# Patient Record
Sex: FEMALE | Race: WHITE | NOT HISPANIC OR LATINO | Employment: OTHER | URBAN - NONMETROPOLITAN AREA
[De-identification: names, ages, dates, MRNs, and addresses within clinical notes are randomized per-mention and may not be internally consistent; named-entity substitution may affect disease eponyms.]

---

## 2017-01-12 ENCOUNTER — INFUSION (OUTPATIENT)
Dept: ONCOLOGY | Facility: HOSPITAL | Age: 47
End: 2017-01-12

## 2017-01-12 VITALS
BODY MASS INDEX: 48.73 KG/M2 | SYSTOLIC BLOOD PRESSURE: 154 MMHG | HEIGHT: 63 IN | OXYGEN SATURATION: 97 % | DIASTOLIC BLOOD PRESSURE: 94 MMHG | RESPIRATION RATE: 20 BRPM | HEART RATE: 102 BPM | WEIGHT: 275 LBS | TEMPERATURE: 97.6 F

## 2017-01-12 DIAGNOSIS — C50.911 MALIGNANT NEOPLASM OF RIGHT FEMALE BREAST, UNSPECIFIED SITE OF BREAST: Primary | ICD-10-CM

## 2017-01-12 DIAGNOSIS — Z95.828 PORT CATHETER IN PLACE: ICD-10-CM

## 2017-01-12 PROCEDURE — 25010000002 HEPARIN FLUSH (PORCINE) 100 UNIT/ML SOLUTION: Performed by: INTERNAL MEDICINE

## 2017-01-12 PROCEDURE — G0463 HOSPITAL OUTPT CLINIC VISIT: HCPCS

## 2017-01-12 RX ORDER — SODIUM CHLORIDE 0.9 % (FLUSH) 0.9 %
10 SYRINGE (ML) INJECTION AS NEEDED
Status: DISCONTINUED | OUTPATIENT
Start: 2017-01-12 | End: 2017-01-12 | Stop reason: HOSPADM

## 2017-01-12 NOTE — PROGRESS NOTES
Unable to access port, rn's x2 attempted. Appears as though port has flipped. md called and will consult a surgeon. Pt and family member told and voice understanding.    No tx provided today.

## 2017-01-19 ENCOUNTER — ANESTHESIA EVENT (OUTPATIENT)
Dept: OPERATING ROOM | Age: 47
End: 2017-01-19

## 2017-01-20 ENCOUNTER — HOSPITAL ENCOUNTER (OUTPATIENT)
Dept: GENERAL RADIOLOGY | Age: 47
Discharge: HOME | End: 2017-01-20
Payer: MEDICARE

## 2017-01-20 ENCOUNTER — HOSPITAL ENCOUNTER (OUTPATIENT)
Dept: PREADMISSION TESTING | Age: 47
Setting detail: OUTPATIENT SURGERY
Discharge: HOME | End: 2017-01-20

## 2017-01-20 ENCOUNTER — HOSPITAL ENCOUNTER (OUTPATIENT)
Dept: LAB | Age: 47
Discharge: HOME | End: 2017-01-20
Payer: MEDICARE

## 2017-01-20 ENCOUNTER — HOSPITAL ENCOUNTER (OUTPATIENT)
Dept: NON INVASIVE DIAGNOSTICS | Age: 47
Discharge: HOME | End: 2017-01-20
Payer: MEDICARE

## 2017-01-20 DIAGNOSIS — Z01.818 PREOP EXAMINATION: ICD-10-CM

## 2017-01-20 LAB
ALBUMIN SERPL-MCNC: 4.5 G/DL (ref 3.5–5.2)
ALP BLD-CCNC: 104 U/L (ref 35–104)
ALT SERPL-CCNC: 22 U/L (ref 5–33)
ANION GAP SERPL CALCULATED.3IONS-SCNC: 18 MMOL/L (ref 7–19)
AST SERPL-CCNC: 7 U/L (ref 5–32)
BASOPHILS ABSOLUTE: 0.1 K/UL (ref 0–0.2)
BASOPHILS RELATIVE PERCENT: 0.5 % (ref 0–1)
BILIRUB SERPL-MCNC: 0.5 MG/DL (ref 0.2–1.2)
BUN BLDV-MCNC: 16 MG/DL (ref 6–20)
CALCIUM SERPL-MCNC: 9.8 MG/DL (ref 8.6–10)
CHLORIDE BLD-SCNC: 98 MMOL/L (ref 98–111)
CO2: 22 MMOL/L (ref 22–29)
CREAT SERPL-MCNC: 0.8 MG/DL (ref 0.5–0.9)
EOSINOPHILS ABSOLUTE: 0.1 K/UL (ref 0–0.6)
EOSINOPHILS RELATIVE PERCENT: 0.8 % (ref 0–5)
GFR NON-AFRICAN AMERICAN: >60
GLOBULIN: 3.8 G/DL
GLUCOSE BLD-MCNC: 124 MG/DL (ref 74–109)
HCT VFR BLD CALC: 49 % (ref 37–47)
HEMOGLOBIN: 16 G/DL (ref 12–16)
LYMPHOCYTES ABSOLUTE: 2.1 K/UL (ref 1.1–4.5)
LYMPHOCYTES RELATIVE PERCENT: 17.8 % (ref 20–40)
MCH RBC QN AUTO: 30.2 PG (ref 27–31)
MCHC RBC AUTO-ENTMCNC: 32.7 G/DL (ref 33–37)
MCV RBC AUTO: 92.6 FL (ref 81–99)
MONOCYTES ABSOLUTE: 0.7 K/UL (ref 0–0.9)
MONOCYTES RELATIVE PERCENT: 6.4 % (ref 0–10)
NEUTROPHILS ABSOLUTE: 8.6 K/UL (ref 1.5–7.5)
NEUTROPHILS RELATIVE PERCENT: 74.5 % (ref 50–65)
PDW BLD-RTO: 14.3 % (ref 11.5–14.5)
PLATELET # BLD: 419 K/UL (ref 130–400)
PMV BLD AUTO: 11 FL (ref 7.4–10.4)
POTASSIUM SERPL-SCNC: 4.3 MMOL/L (ref 3.5–5)
RBC # BLD: 5.29 M/UL (ref 4.2–5.4)
SODIUM BLD-SCNC: 138 MMOL/L (ref 136–145)
TOTAL PROTEIN: 8.3 G/DL (ref 6.6–8.7)
WBC # BLD: 11.6 K/UL (ref 4.8–10.8)

## 2017-01-20 PROCEDURE — 71020 XR CHEST STANDARD TWO VW: CPT

## 2017-01-20 PROCEDURE — 93005 ELECTROCARDIOGRAM TRACING: CPT

## 2017-01-24 ENCOUNTER — HOSPITAL ENCOUNTER (OUTPATIENT)
Age: 47
Setting detail: OUTPATIENT SURGERY
Discharge: HOME | End: 2017-01-24
Attending: SPECIALIST | Admitting: SPECIALIST

## 2017-01-24 ENCOUNTER — ANESTHESIA (OUTPATIENT)
Dept: OPERATING ROOM | Age: 47
End: 2017-01-24
Payer: MEDICARE

## 2017-01-24 VITALS
BODY MASS INDEX: 44.39 KG/M2 | HEIGHT: 64 IN | RESPIRATION RATE: 18 BRPM | SYSTOLIC BLOOD PRESSURE: 146 MMHG | HEART RATE: 98 BPM | TEMPERATURE: 99.4 F | DIASTOLIC BLOOD PRESSURE: 97 MMHG | OXYGEN SATURATION: 95 % | WEIGHT: 260 LBS

## 2017-01-24 VITALS — OXYGEN SATURATION: 99 % | DIASTOLIC BLOOD PRESSURE: 71 MMHG | SYSTOLIC BLOOD PRESSURE: 126 MMHG

## 2017-01-24 PROCEDURE — 00400 ANES INTEGUMENTARY SYS NOS: CPT | Performed by: NURSE ANESTHETIST, CERTIFIED REGISTERED

## 2017-01-24 PROCEDURE — G8907 PT DOC NO EVENTS ON DISCHARG: HCPCS

## 2017-01-24 PROCEDURE — 36590 REMOVAL TUNNELED CV CATH: CPT

## 2017-01-24 PROCEDURE — G8916 PT W IV AB GIVEN ON TIME: HCPCS

## 2017-01-24 RX ORDER — HYDROMORPHONE HCL 110MG/55ML
0.25 PATIENT CONTROLLED ANALGESIA SYRINGE INTRAVENOUS EVERY 5 MIN PRN
Status: DISCONTINUED | OUTPATIENT
Start: 2017-01-24 | End: 2017-01-24 | Stop reason: HOSPADM

## 2017-01-24 RX ORDER — ONDANSETRON 2 MG/ML
4 INJECTION INTRAMUSCULAR; INTRAVENOUS
Status: DISCONTINUED | OUTPATIENT
Start: 2017-01-24 | End: 2017-01-24 | Stop reason: HOSPADM

## 2017-01-24 RX ORDER — PROPOFOL 10 MG/ML
INJECTION, EMULSION INTRAVENOUS PRN
Status: DISCONTINUED | OUTPATIENT
Start: 2017-01-24 | End: 2017-01-24 | Stop reason: SDUPTHER

## 2017-01-24 RX ORDER — PROMETHAZINE HYDROCHLORIDE 25 MG/ML
12.5 INJECTION, SOLUTION INTRAMUSCULAR; INTRAVENOUS
Status: DISCONTINUED | OUTPATIENT
Start: 2017-01-24 | End: 2017-01-24 | Stop reason: HOSPADM

## 2017-01-24 RX ORDER — HYDRALAZINE HYDROCHLORIDE 20 MG/ML
5 INJECTION INTRAMUSCULAR; INTRAVENOUS EVERY 10 MIN PRN
Status: DISCONTINUED | OUTPATIENT
Start: 2017-01-24 | End: 2017-01-24 | Stop reason: HOSPADM

## 2017-01-24 RX ORDER — SODIUM CHLORIDE, SODIUM LACTATE, POTASSIUM CHLORIDE, CALCIUM CHLORIDE 600; 310; 30; 20 MG/100ML; MG/100ML; MG/100ML; MG/100ML
INJECTION, SOLUTION INTRAVENOUS CONTINUOUS
Status: DISCONTINUED | OUTPATIENT
Start: 2017-01-24 | End: 2017-01-24 | Stop reason: HOSPADM

## 2017-01-24 RX ORDER — OXYCODONE HYDROCHLORIDE AND ACETAMINOPHEN 5; 325 MG/1; MG/1
1 TABLET ORAL PRN
Status: DISCONTINUED | OUTPATIENT
Start: 2017-01-24 | End: 2017-01-24 | Stop reason: HOSPADM

## 2017-01-24 RX ORDER — FENTANYL CITRATE 50 UG/ML
INJECTION, SOLUTION INTRAMUSCULAR; INTRAVENOUS PRN
Status: DISCONTINUED | OUTPATIENT
Start: 2017-01-24 | End: 2017-01-24 | Stop reason: SDUPTHER

## 2017-01-24 RX ORDER — HYDROMORPHONE HCL 110MG/55ML
0.5 PATIENT CONTROLLED ANALGESIA SYRINGE INTRAVENOUS EVERY 5 MIN PRN
Status: DISCONTINUED | OUTPATIENT
Start: 2017-01-24 | End: 2017-01-24 | Stop reason: HOSPADM

## 2017-01-24 RX ORDER — LABETALOL HYDROCHLORIDE 5 MG/ML
5 INJECTION, SOLUTION INTRAVENOUS EVERY 10 MIN PRN
Status: DISCONTINUED | OUTPATIENT
Start: 2017-01-24 | End: 2017-01-24 | Stop reason: HOSPADM

## 2017-01-24 RX ORDER — DIPHENHYDRAMINE HYDROCHLORIDE 50 MG/ML
12.5 INJECTION INTRAMUSCULAR; INTRAVENOUS
Status: DISCONTINUED | OUTPATIENT
Start: 2017-01-24 | End: 2017-01-24 | Stop reason: HOSPADM

## 2017-01-24 RX ORDER — AMLODIPINE BESYLATE 5 MG/1
5 TABLET ORAL DAILY
COMMUNITY

## 2017-01-24 RX ORDER — MEPERIDINE HYDROCHLORIDE 25 MG/ML
12.5 INJECTION INTRAMUSCULAR; INTRAVENOUS; SUBCUTANEOUS EVERY 5 MIN PRN
Status: DISCONTINUED | OUTPATIENT
Start: 2017-01-24 | End: 2017-01-24 | Stop reason: HOSPADM

## 2017-01-24 RX ORDER — LIDOCAINE HYDROCHLORIDE 10 MG/ML
INJECTION, SOLUTION INFILTRATION; PERINEURAL PRN
Status: DISCONTINUED | OUTPATIENT
Start: 2017-01-24 | End: 2017-01-24 | Stop reason: HOSPADM

## 2017-01-24 RX ORDER — METOPROLOL TARTRATE 50 MG/1
50 TABLET, FILM COATED ORAL DAILY
COMMUNITY

## 2017-01-24 RX ORDER — OXYCODONE HYDROCHLORIDE AND ACETAMINOPHEN 5; 325 MG/1; MG/1
2 TABLET ORAL PRN
Status: DISCONTINUED | OUTPATIENT
Start: 2017-01-24 | End: 2017-01-24 | Stop reason: HOSPADM

## 2017-01-24 RX ORDER — FENTANYL CITRATE 50 UG/ML
50 INJECTION, SOLUTION INTRAMUSCULAR; INTRAVENOUS EVERY 5 MIN PRN
Status: DISCONTINUED | OUTPATIENT
Start: 2017-01-24 | End: 2017-01-24 | Stop reason: HOSPADM

## 2017-01-24 RX ORDER — MIDAZOLAM HYDROCHLORIDE 1 MG/ML
INJECTION INTRAMUSCULAR; INTRAVENOUS PRN
Status: DISCONTINUED | OUTPATIENT
Start: 2017-01-24 | End: 2017-01-24 | Stop reason: SDUPTHER

## 2017-01-24 RX ORDER — LEVOTHYROXINE SODIUM 0.1 MG/1
100 TABLET ORAL DAILY
COMMUNITY

## 2017-01-24 RX ORDER — HYDROCODONE BITARTRATE AND ACETAMINOPHEN 7.5; 325 MG/1; MG/1
1 TABLET ORAL EVERY 4 HOURS PRN
Qty: 30 TABLET | Refills: 0
Start: 2017-01-24 | End: 2017-01-31

## 2017-01-24 RX ADMIN — MIDAZOLAM HYDROCHLORIDE 1 MG: 1 INJECTION INTRAMUSCULAR; INTRAVENOUS at 13:52

## 2017-01-24 RX ADMIN — FENTANYL CITRATE 50 MCG: 50 INJECTION, SOLUTION INTRAMUSCULAR; INTRAVENOUS at 13:52

## 2017-01-24 RX ADMIN — PROPOFOL 200 MG: 10 INJECTION, EMULSION INTRAVENOUS at 13:53

## 2017-01-24 RX ADMIN — SODIUM CHLORIDE, SODIUM LACTATE, POTASSIUM CHLORIDE, CALCIUM CHLORIDE: 600; 310; 30; 20 INJECTION, SOLUTION INTRAVENOUS at 12:58

## 2019-03-12 ENCOUNTER — LAB REQUISITION (OUTPATIENT)
Dept: LAB | Facility: HOSPITAL | Age: 49
End: 2019-03-12

## 2019-03-12 DIAGNOSIS — Z17.1 ESTROGEN RECEPTOR NEGATIVE TUMOR STATUS: ICD-10-CM

## 2019-03-12 DIAGNOSIS — C50.411 MALIGNANT NEOPLASM OF UPPER-OUTER QUADRANT OF RIGHT FEMALE BREAST (HCC): ICD-10-CM

## 2019-03-12 LAB
ALBUMIN SERPL-MCNC: 4.8 G/DL (ref 3.5–5)
ALBUMIN/GLOB SERPL: 1.3 G/DL (ref 1.1–2.5)
ALP SERPL-CCNC: 115 U/L (ref 24–120)
ALT SERPL W P-5'-P-CCNC: 22 U/L (ref 0–54)
ANION GAP SERPL CALCULATED.3IONS-SCNC: 15 MMOL/L (ref 4–13)
AST SERPL-CCNC: 9 U/L (ref 7–45)
BILIRUB SERPL-MCNC: 0.6 MG/DL (ref 0.1–1)
BUN BLD-MCNC: 18 MG/DL (ref 5–21)
BUN/CREAT SERPL: 26.5 (ref 7–25)
CALCIUM SPEC-SCNC: 10.6 MG/DL (ref 8.4–10.4)
CEA SERPL-MCNC: 3.52 NG/ML (ref 0–5)
CHLORIDE SERPL-SCNC: 99 MMOL/L (ref 98–110)
CO2 SERPL-SCNC: 26 MMOL/L (ref 24–31)
CREAT BLD-MCNC: 0.68 MG/DL (ref 0.5–1.4)
GFR SERPL CREATININE-BSD FRML MDRD: 92 ML/MIN/1.73
GLOBULIN UR ELPH-MCNC: 3.8 GM/DL
GLUCOSE BLD-MCNC: 133 MG/DL (ref 70–100)
POTASSIUM BLD-SCNC: 4.7 MMOL/L (ref 3.5–5.3)
PROT SERPL-MCNC: 8.6 G/DL (ref 6.3–8.7)
SODIUM BLD-SCNC: 140 MMOL/L (ref 135–145)

## 2019-03-12 PROCEDURE — 86300 IMMUNOASSAY TUMOR CA 15-3: CPT | Performed by: INTERNAL MEDICINE

## 2019-03-12 PROCEDURE — 80053 COMPREHEN METABOLIC PANEL: CPT | Performed by: INTERNAL MEDICINE

## 2019-03-12 PROCEDURE — 82378 CARCINOEMBRYONIC ANTIGEN: CPT | Performed by: INTERNAL MEDICINE

## 2019-03-14 LAB — CANCER AG27-29 SERPL-ACNC: 26.1 U/ML (ref 0–38.6)

## 2019-12-05 DIAGNOSIS — C50.919 MALIGNANT NEOPLASM OF FEMALE BREAST, UNSPECIFIED ESTROGEN RECEPTOR STATUS, UNSPECIFIED LATERALITY, UNSPECIFIED SITE OF BREAST (HCC): Primary | ICD-10-CM

## 2019-12-06 DIAGNOSIS — C50.919 MALIGNANT NEOPLASM OF FEMALE BREAST, UNSPECIFIED ESTROGEN RECEPTOR STATUS, UNSPECIFIED LATERALITY, UNSPECIFIED SITE OF BREAST (HCC): Primary | ICD-10-CM

## 2019-12-10 ENCOUNTER — LAB (OUTPATIENT)
Dept: ONCOLOGY | Facility: CLINIC | Age: 49
End: 2019-12-10

## 2019-12-10 DIAGNOSIS — C50.919 MALIGNANT NEOPLASM OF FEMALE BREAST, UNSPECIFIED ESTROGEN RECEPTOR STATUS, UNSPECIFIED LATERALITY, UNSPECIFIED SITE OF BREAST (HCC): ICD-10-CM

## 2019-12-10 LAB
ALBUMIN SERPL-MCNC: 4.5 G/DL (ref 3.5–5.2)
ALBUMIN/GLOB SERPL: 1.3 G/DL
ALP SERPL-CCNC: 111 U/L (ref 39–117)
ALT SERPL W P-5'-P-CCNC: 14 U/L (ref 1–33)
ANION GAP SERPL CALCULATED.3IONS-SCNC: 15 MMOL/L (ref 5–15)
AST SERPL-CCNC: 7 U/L (ref 1–32)
BASOPHILS # BLD AUTO: 0.07 10*3/MM3 (ref 0–0.2)
BASOPHILS NFR BLD AUTO: 0.7 % (ref 0–1.5)
BILIRUB SERPL-MCNC: 0.4 MG/DL (ref 0.2–1.2)
BUN BLD-MCNC: 15 MG/DL (ref 6–20)
BUN/CREAT SERPL: 20.3 (ref 7–25)
CALCIUM SPEC-SCNC: 10 MG/DL (ref 8.6–10.5)
CHLORIDE SERPL-SCNC: 98 MMOL/L (ref 98–107)
CO2 SERPL-SCNC: 27 MMOL/L (ref 22–29)
CREAT BLD-MCNC: 0.74 MG/DL (ref 0.57–1)
DEPRECATED RDW RBC AUTO: 45.4 FL (ref 37–54)
EOSINOPHIL # BLD AUTO: 0.07 10*3/MM3 (ref 0–0.4)
EOSINOPHIL NFR BLD AUTO: 0.7 % (ref 0.3–6.2)
ERYTHROCYTE [DISTWIDTH] IN BLOOD BY AUTOMATED COUNT: 12.8 % (ref 12.3–15.4)
GFR SERPL CREATININE-BSD FRML MDRD: 83 ML/MIN/1.73
GLOBULIN UR ELPH-MCNC: 3.4 GM/DL
GLUCOSE BLD-MCNC: 154 MG/DL (ref 65–99)
HCT VFR BLD AUTO: 49.2 % (ref 34–46.6)
HGB BLD-MCNC: 15.6 G/DL (ref 12–15.9)
IMM GRANULOCYTES # BLD AUTO: 0.04 10*3/MM3 (ref 0–0.05)
IMM GRANULOCYTES NFR BLD AUTO: 0.4 % (ref 0–0.5)
LYMPHOCYTES # BLD AUTO: 1.83 10*3/MM3 (ref 0.7–3.1)
LYMPHOCYTES NFR BLD AUTO: 17 % (ref 19.6–45.3)
MCH RBC QN AUTO: 30.1 PG (ref 26.6–33)
MCHC RBC AUTO-ENTMCNC: 31.7 G/DL (ref 31.5–35.7)
MCV RBC AUTO: 95 FL (ref 79–97)
MONOCYTES # BLD AUTO: 0.68 10*3/MM3 (ref 0.1–0.9)
MONOCYTES NFR BLD AUTO: 6.3 % (ref 5–12)
NEUTROPHILS # BLD AUTO: 8.07 10*3/MM3 (ref 1.7–7)
NEUTROPHILS NFR BLD AUTO: 74.9 % (ref 42.7–76)
PLATELET # BLD AUTO: 262 10*3/MM3 (ref 140–450)
PMV BLD AUTO: 10.4 FL (ref 6–12)
POTASSIUM BLD-SCNC: 3.9 MMOL/L (ref 3.5–5.2)
PROT SERPL-MCNC: 7.9 G/DL (ref 6–8.5)
RBC # BLD AUTO: 5.18 10*6/MM3 (ref 3.77–5.28)
SODIUM BLD-SCNC: 140 MMOL/L (ref 136–145)
WBC NRBC COR # BLD: 10.76 10*3/MM3 (ref 3.4–10.8)

## 2019-12-10 PROCEDURE — 82378 CARCINOEMBRYONIC ANTIGEN: CPT | Performed by: INTERNAL MEDICINE

## 2019-12-10 PROCEDURE — 86300 IMMUNOASSAY TUMOR CA 15-3: CPT | Performed by: INTERNAL MEDICINE

## 2019-12-10 PROCEDURE — 80053 COMPREHEN METABOLIC PANEL: CPT | Performed by: INTERNAL MEDICINE

## 2019-12-10 PROCEDURE — 85025 COMPLETE CBC W/AUTO DIFF WBC: CPT | Performed by: INTERNAL MEDICINE

## 2019-12-10 PROCEDURE — 36415 COLL VENOUS BLD VENIPUNCTURE: CPT | Performed by: INTERNAL MEDICINE

## 2019-12-11 LAB — CEA SERPL-MCNC: 3.8 NG/ML

## 2019-12-12 LAB — CANCER AG27-29 SERPL-ACNC: 29.9 U/ML (ref 0–38.6)

## 2019-12-14 NOTE — PROGRESS NOTES
[facility] ONC [facility] ONCOLOGY  [address] [place] [address] [address]  [facility] [address]  [phone]    Patient Name: Gopal Powers  Encounter Date: 12/17/2019  YOB: 1970  Patient Number: [id number]      REASON FOR VISIT:  Ms. Gopal Powers is a 49-year-old female who returns in follow-up of locally advanced breast cancer. She is seen 78 months since completing cycle 6 of neoadjuvant ACT chemotherapy. She underwent bilateral mastectomies on 07/30/2013 and is now 73.5 months since cycle 4 of adjuvant cisplatin. She completed adjuvant radiation 69 months ago on 07/14/2014. She is here with her sister, Shayla (previously with her brother-in-law, Padilla). History is obtained from the patient who is considered to be a reliable historian.     DIAGNOSTIC ABNORMALITIES:   1. Bilateral mammogram and right breast ultrasound, 12/18/2012, [facility]: Nodule just superior and lateral to the right nipple measuring 15 x 16 mm. Another density in the upper outer quadrant measures 12 x 17 mm. Deep to this is a large nodular group of nodules that measure 3.4 x 3.6 cm. Another 3 cm nodule, probable enlarged lymph node, in the right axillary region. Ultrasound showed multiple lesions at the 11 o'clock position. One measures 2.9 x 2.4 x 2 cm. Another 1.7 x 1.4 x 1.8 cm. Another 1.9 x 1.6 x 1.9 cm (the latter predominantly solid). The 4th lesion at the 11 o'clock position measures 1.2 x 0.8 x 1.1 cm. Three nodules in the right axillary region; one measures 2.9 x 2.7 x 2.1 cm, another 1.8 x 1.3 x 1.7 cm, and a third measures 1.2 x 0.7 x 1 cm. The lesion at the 10 o'clock position in the right breast measures 1.4 x 1.2 x 1.6 cm. This appears lobulated, predominantly solid.  2. Chest x-ray, 01/04/2013: Negative chest radiograph.  3. Ultrasound-guided biopsy of the right breast masses, 01/07/2013: Large lobulated mass in the upper outer quadrant of the right breast and the retroareolar  area of the right breast. Noted was an enlarged pathologic appearing right axillary node which was not biopsied. Pathology from the specimens taken during the procedure included the biopsy from the 10 o'clock position (5.7 cm lesion) revealing infiltrating mammary carcinoma. Tumor present in virtually every biopsy. Tumor high grade (20 mitoses per high-powered field). Tumor measured 1 cm in greatest linear dimension. The specimen from the 11 o'clock position in the retroareolar area measured 1.7 cm. Infiltrating mammary carcinoma. Tumor present in virtually every biopsy. Tumor high grade. Tumor measured 1.1 cm in greatest linear dimension.  4. CT scan chest/abdomen/pelvis 01/21/2013, [facility]: Right axillary lymphadenopathy (the more anterior of 2 nodules measures 3.5 x 2.7 cm; the more posterior measures 2.6 x 2.1 cm). Skin thickening in the partially visualized right breast. Left ovary enlarged with a dermoid tumor within it. Previous cholecystectomy.  5. Labs, 02/01/2013: Hemoglobin 15.8, hematocrit 49, MCV 93.1, platelets 350,000, WBC 11.7, ANC 8.6. CMP revealed glucose 164, potassium 3.1, SGOT 6, GFR 97.1. CEA 3.2, CA27-29 of 22.9.  6. 2D echo, 02/05/2013, [facility]: Technically limited and suboptimal study. Left ventricular chamber size, wall thickness, and wall motion normal. No segmental wall motion abnormalities noted. Left ventricular (LV) systolic function normal. Ejection fraction measured at 63%. Left ventricular (LV) diastolic parameters normal. Both atria are normal size. No atrial clots or masses seen. Mitral valve leaflets normal thickness and excursion. No mitral regurgitation. Aortic valve leaflets normal thickness and excursion. Minimal aortic regurgitation. No aortic stenosis. Tricuspid valve anatomically normal. No tricuspid regurgitation. No pericardial effusion.  7. PET scan, 10/15/2013, [facility]: Abnormal uptake in the chest wall bilaterally that  appears to correspond to areas of scarring in both breasts (likely related to the recent surgery), greater on the right side with SUV max 6.9. Multiple areas of paravertebral uptake in the thorax, likely muscular or related to brown fat uptake. Mild uptake in 3 small normal sized lymph nodes in the left axilla. SUV max of the most intense uptake 2. 4.4 cm dermoid tumor in the pelvis in the midline anterior to the uterine fundus, likely arising from the left ovary. Right ovary seen separately. No other areas of pathologic uptake are seen within the neck, chest, abdomen, or pelvis.  8. BRCA1 and 2, 12/02/2012, [facility]: No mutations detected.  9. Surgical pathology, left tube and ovary, 05/29/2014, [facility]: Fallopian tube showed no significant pathologic changes. Ovary showed benign mature teratoma.  10. Peripheral blood comprehensive report, 11/16/2016: Mild absolute leukocytosis. COMMENT. Peripheral blood evaluation: Leukocytosis comprised predominantly of granulocytes with a normal complement of lymphocytes, monocytes, and platelets. Flow cytometric studies: No evidence of significant immunophenotypic aberrancies. No specific findings in the peripheral blood to suggest a cause for the patient's relative neutrophilia. Diagnosis of a myeloid stem cell disorder, if clinically suspected, is best made using bone marrow biopsy. Peripheral blood may not always be fully representative of an underlying bone marrow disorder. Clinical correlation recommended.    PREVIOUS INTERVENTIONS:   1. Neoadjuvant ACT chemotherapy, 02/18/2013 through 06/18/2013. 6 cycles. Prior cycles of chemo delayed due to shingles outbreak.  2. Right modified radical mastectomy (MRM), left prophylactic mastectomy, 07/30/2013. Dr. Mejia. Pathology revealed: 1) Breast and lymph node, right modified radical mastectomy: Poorly differentiated infiltrating ductal cell carcinoma with extensive necrosis. (Margins of resection free of involvement).  Fibrocystic changes. Fibroadenoma. Metastatic carcinoma in lymph nodes (2 out of 27 lymph nodes). 2) Breast, total left mastectomy: Nonproliferative fibrocystic changes (negative for malignancy). ER: Absent/Negative 0.81%. MO: Absent/Negative 0.09 %. HER-2: Not over-expressed. 1+  3. Adjuvant cisplatin chemotherapy, 11/06/2013 through 01/16/2014. 4 cycles.  4. Adjuvant radiation beginning 05/13/2014 through 07/14/2014 (5040 cGy with 1000 cGy boost).        Problem List Items Addressed This Visit        Other    Malignant neoplasm of right female breast (CMS/HCC) - Primary           Malignant neoplasm of right female breast (CMS/HCC)    9/2/2016 Initial Diagnosis     Malignant neoplasm of right female breast (CMS/HCC)      12/13/2019 Cancer Staged     Staging form: Breast, AJCC V7  - Clinical stage from 12/13/2019: Stage IIIA (T3(m), N2a, M0) - Signed by Ludwin Whitaker MD on 12/13/2019         PAST MEDICAL HISTORY:  ALLERGIES:  No Known Allergies  CURRENT MEDICATIONS:  Outpatient Encounter Medications as of 12/17/2019   Medication Sig Dispense Refill   • amLODIPine (NORVASC) 5 MG tablet Take 5 mg by mouth Daily.  3   • atorvastatin (LIPITOR) 40 MG tablet Take 40 mg by mouth Daily.  3   • levothyroxine (SYNTHROID, LEVOTHROID) 50 MCG tablet Take 50 mcg by mouth Every Morning Before Breakfast.  3   • metFORMIN (GLUCOPHAGE) 500 MG tablet Take 500 mg by mouth.       No facility-administered encounter medications on file as of 12/17/2019.      ADULT ILLNESSES:   Breast cancer ( ER Status: Negative; MO Status: Negative; ICD-10:C50.411 ;Malignant neoplasm of upper-outer quadrant of right female breast )   Abnormal chest x ray ( ICD-10:R93.8 ;Abnormal findings on diagnostic imaging of other specified body structures   Chronic kidney disease ( ICD-10:N18.3 ;Chronic kidney disease, stage 3 (moderate)   Disorder of breast (disorder) ( lesion; ICD-10:D24.1 ;Benign neoplasm of right breast )   History of mastectomy (  ICD-10:Z90.11 ;Acquired absence of right breast and nipple   Hyperlipidemia ( ICD-10:E78.5 ;Hyperlipidemia, unspecified   Hypertension ( ICD-10:I10 ;Essential (primary) hypertension   Hypothyroidism ( ICD-10:E03.9 ;Hypothyroidism, unspecified   Leukocytosis ( ICD-10:D72.829 ;Elevated white blood cell count, unspecified   Mental retardation ( ICD-10:F79 ;Unspecified intellectual disabilities   Obesity ( ICD-10:E66.9 ;Obesity, unspecified   Wound dehiscence ( right mastectomy; ICD-10:T81.31xS ;Disruption of external operation (surgical) wound, not elsewhere classified, sequela )    SURGERIES:   Removal of left port and placement of right subclavian port, 03/05/2013. Dr. Mejia   Left clavicle repair of childhood fracture   Excision of breast lump: Right breast mass excision, 11/10/2011, Dr. Saini   Port removal (right subclavian) sometime 01/23/2017. Dr. Baldwin   Laparoscopic cholecystectomy, 2001, Dr. Flynn   Oophorectomy, left, 06/29/2014, Dr. Dodson   Oophorectomy, right, 05/29/2014. A 4.4 cm dermoid tumor. Pathology showed benign teratoma   Left subclavian Mediport, 02/08/2013, Dr. Mejia      ADULT ILLNESSES:  Patient Active Problem List   Diagnosis Code   • Malignant neoplasm of right female breast (CMS/HCC) C50.911   • Port catheter in place Z95.828     SURGERIES:  No past surgical history on file.  HEALTH MAINTENANCE ITEMS:  Health Maintenance Due   Topic Date Due   • MAMMOGRAM  1970   • TDAP/TD VACCINES (1 - Tdap) 07/04/1980   • ZOSTER VACCINE (1 of 2) 07/04/2019   • INFLUENZA VACCINE  08/01/2019   • MEDICARE ANNUAL WELLNESS  12/05/2019   • PAP SMEAR  12/05/2019   • COLONOSCOPY  12/05/2019       <no information>  Last Completed Colonoscopy       Status Date      COLONOSCOPY No completions recorded          There is no immunization history on file for this patient.  Last Completed Mammogram       Status Date      MAMMOGRAM No completions recorded            FAMILY HISTORY:  History reviewed. No  "pertinent family history.  SOCIAL HISTORY:  Social History     Socioeconomic History   • Marital status: Single     Spouse name: Not on file   • Number of children: Not on file   • Years of education: Not on file   • Highest education level: Not on file   Tobacco Use   • Smoking status: Never Smoker   • Smokeless tobacco: Never Used       REVIEW OF SYSTEMS:  Constitutional:   The patient's appetite is good, sister says, \"a bit too good.\" Her energy is fair. Sister says she remains sedentary. She has regained 3 pounds (lost 3 pounds at her prior visit - had intentionally lost 27 pounds at her visit prior to that) since her last visit. Sister says she still supervises her meals, \"healthier choices.\" She has no fevers, chills, or drenching night sweats. Her sleep habits seem appropriate.  Ear/Nose/Mouth/Throat:   She reports no ear pains, but has allergy sinus symptoms, without sore throat, nosebleeds, or sore tongue. She has no headaches. She denies any hoarseness, change in voice quality, or hemoptysis.  Ocular:   She reports no eye pain, significant change in visual acuity, double vision, or blurry vision.  Respiratory:   She has baseline exertional dyspnea but has no shortness of breath with her routine activities (sedentary). She reports no cough, no significant shortness of breathing at rest, phlegm production, or unexplained chest wall pain.  Cardiovascular:   She reports no exertional chest pain, chest pressure, or chest heaviness. She has no claudication. She reports no palpitations or symptomatic orthostasis.  Gastrointestinal:   She reports no dysphagia, nausea, vomiting, postprandial abdominal pain, bloating, cramping, change in bowel habits, or discoloration of the stool. She reports no rectal bleeding. She has not had any constipation but still has bouts of soft stools (chronic) but no overt diarrhea.  Genitourinary:   She reports no urinary burning, frequency, dribbling, or discoloration. She reports no " "need to urinate frequently through the night. She reports no difficulty controlling her bladder.  GYN:   She has no unexplained vaginal bleeding and no significant vaginal discharge.  Breasts:   She denies any more \"soreness\" from the surgical sites.  Musculoskeletal:   She reports no unexplained arthralgias, myalgias, or nighttime leg cramping.  Extremities:   She reports trouble with fluid retention in the arms but no significant leg swelling.  Endocrine:   She reports no problems with excess thirst, excessive urination, vasomotor instability, or unexplained fatigue.  Heme/Lymphatic:   She reports no unexplained bleeding, bruising, petechial rash, or swollen glands.  Skin:   She has no recurrent itching or rash around the lower abdominal incision site. No recurrent shingles on her back.  Neuro:   She reports no loss of consciousness, seizures, fainting spells, or dizziness. She has no weakness of her face, arms, or legs. She has no difficulty with speech. She has no tremors.  Psych:   She seems generally satisfied with life. She denies depression. She reports no mood swings.      VITAL SIGNS: /94   Pulse 102   Temp 97.4 °F (36.3 °C)   Resp 16   Ht 160 cm (63\")   Wt 122 kg (269 lb)   SpO2 97%   Breastfeeding No   BMI 47.65 kg/m² Body surface area is 2.19 meters squared.  Pain Score    12/17/19 1040   PainSc: 0-No pain         PHYSICAL EXAMINATION:   General:   She is a pleasant, obese but visibly slimmer and modestly-kept female who is comfortable at rest. She arrived in the exam room ambulatory. She appears to be her stated age. Her skin color is normal. ECOG PS = 0  Head/Neck:   The patient is anicteric and atraumatic. The oropharynx, mouth, and throat are clear. Dentition is poor. The trachea is midline. The neck is supple without evidence of jugular venous distention or cervical adenopathy.  Eyes:   The pupils are equal, round, and reactive to light. The extraocular movements are full. There is no " scleral jaundice or erythema.  Chest:   The respiratory efforts are normal and unhindered. The chest is clear to auscultation and percussion. There are no wheezes, rhonchi, rales, or asymmetry of breath sounds.  Breasts:   Inspection of the mastectomy sites is notable for healed wounds with underlying scar thickness and overlying skin folds (left over post-op). There is no axillary adenopathy. The previous Mediport site in the left upper chest is healed. The right-sided Mediport has been removed (sometime 01/23/2017 per Dr. Baldwin) after it flipped over. The site is healed with moderate scar formation.  Cardiovascular:   The patient has a regular cardiac rate and rhythm without murmurs, rubs, or gallops. The peripheral pulses are equal and full.  Abdomen:   The belly is soft and obese. Her habitus precludes an adequate exam. There is no rebound or guarding. There is no obvious organomegaly, mass-effect, or tenderness. Bowel sounds are active and of normal character. The lower abdominal incision is healed.   Extremities:   There is evidence of arm lymphedema, right greater than left with no evidence of cyanosis or clubbing with 1+ ankle edema.  Rheumatologic:   There is no overt evidence of rheumatoid deformities of the hands. There is no sausaging of the fingers. There is no sign of active synovitis. The gait is normal.  Cutaneous:   There are no overt rashes, disseminated lesions, purpura, or petechiae.   Lymphatics:   There is no evidence of adenopathy in the cervical, supraclavicular, or axillary areas.  Neurologic:   The patient is alert, oriented, cooperative, and pleasant. She is appropriately conversant. She ambulated into the exam room without assistance and transferred from chair to exam table unassisted today. There is no overt dysfunction of the motor, sensory, or cerebellar systems.  Psych:   Mood and affect are appropriate for circumstance. Eye contact is appropriate.        LABS    Lab Results - Last  18 Months   Lab Units 12/10/19  1329   HEMOGLOBIN g/dL 15.6   HEMATOCRIT % 49.2*   MCV fL 95.0   WBC 10*3/mm3 10.76   RDW % 12.8   MPV fL 10.4   PLATELETS 10*3/mm3 262   IMM GRAN % % 0.4   NEUTROS ABS 10*3/mm3 8.07*   LYMPHS ABS 10*3/mm3 1.83   MONOS ABS 10*3/mm3 0.68   EOS ABS 10*3/mm3 0.07   BASOS ABS 10*3/mm3 0.07   IMMATURE GRANS (ABS) 10*3/mm3 0.04       Lab Results - Last 18 Months   Lab Units 12/10/19  1329 03/12/19  1600   GLUCOSE mg/dL 154* 133*   SODIUM mmol/L 140 140   POTASSIUM mmol/L 3.9 4.7   CO2 mmol/L 27.0 26.0   CHLORIDE mmol/L 98 99   ANION GAP mmol/L 15.0 15.0*   CREATININE mg/dL 0.74 0.68   BUN mg/dL 15 18   BUN / CREAT RATIO  20.3 26.5*   CALCIUM mg/dL 10.0 10.6*   EGFR IF NONAFRICN AM mL/min/1.73 83 92   ALK PHOS U/L 111 115   TOTAL PROTEIN g/dL 7.9 8.6   ALT (SGPT) U/L 14 22   AST (SGOT) U/L 7 9   BILIRUBIN mg/dL 0.4 0.6   ALBUMIN g/dL 4.50 4.80   GLOBULIN gm/dL 3.4 3.8       Lab Results - Last 18 Months   Lab Units 12/10/19  1329 03/12/19  1600   CEA ng/mL 3.80 3.52       ASSESSMENT:   1. Right breast invasive ductal carcinoma:   Stage: At least IIIA (cT3, cN2a, M0, G3), ER (-)/WA (-). HER-2/[place] (-). High grade (20 mitoses per high-powered field).   Tumor [place]: Multiple solid nodules in the right breast (multiple lesions at 11 o'clock measuring 2.9 x 2.4 x 2.2 cm, 1.7 x 1.4 x 1.8 cm, 1.9 x 1.6 x 1.9 cm, 1.2 x 0.8 x 1.1 cm); a lesion at the 10 o'clock in the right breast measuring 1.4 x 1.2 x 1.6 cm; 3 nodules in the right axillary region measuring 2.9 x 2.7 x 2.1 cm, another 1.8 x 1.3 x 1.7 cm, and a third measuring 1.2 x 0.7 x 1 cm.   Complications of Tumor: Pain and fullness of the right breast and right axilla.   BRCA status (1 and 2): No mutations, 12/2013.   Tumor Status: Underwent neoadjuvant chemotherapy followed by bilateral mastectomies, 07/30/2013. Pathology revealed: 1. Breast and lymph node, right modified radical mastectomy: Poorly differentiated infiltrating ductal cell  "carcinoma with extensive necrosis. (Margins of resection free of involvement). Fibrocystic changes. Fibroadenoma. Metastatic carcinoma in lymph nodes (2 out of 27 lymph nodes). 2. Breast, total left mastectomy: Nonproliferative fibrocystic changes (negative for malignancy). Adjuvant cisplatin and radiation completed. LENARD.  2. Hypercalcemia, mild. Has stopped Diovan/HCTZ. Resolved  3. Mental retardation.   4. Mild leukocytosis, NOS. Flow cytometry, 11/16/2016 (above) unrevealing. Normal counts since 10/24/2018.   5. Borderline erythrocytosis.   6. Lymphedema both arms. Asymptomatic.   7. Hypothyroidism.   8. Mildly abnormal CEA. Stable, 3.8 on 12/10/2019 (prior range: 2.2 - 5.2).   9. Obesity.   10. Chronic kidney disease, GFR 57.35 mL/min, 01/27/2014, and 02/12/2014. Stable, GFR 83 mL/min, 12/10/2019 (prior range: 51 - 92 mL/min).   11. Abnormal chest x-ray, 02/06/2015. Atelectasis versus small area of pneumonia.   a. Repeat chest x-ray 04/06/2015 was essentially unchanged, PCP says \"everything okay.\"   b. Chest x-ray, 10/16/2015 and 03/11/2016 at [facility]: No active disease.   c. [facility], Chest x-ray, 06/09/2016. Impression: Chronic elevation of the right diaphragm. No acute cardiac or pulmonary abnormality is identified.   d. Chest x-ray performed at [facility] on 4/28/2017. Impression: Chronic elevation of the right diaphragm. No acute cardiac or pulmonary abnormality is identified.    PLAN:  1.  Re: Apprised of labs from 12/10/2019 with stable CBC, including no leukocytosis, normal MCV without anemia, normal platelets, mild hyperglycemia, normocalcemia with otherwise normal CMP, elevated (stable) CEA, and normal CA27-29. Consider restaging scans if CEA continues to rise.   2. Remind them of the plans as outlined by Dr. Puente on 09/30/2013 (date she saw the patient). Dr. Puente suggested a PET/CT (above) and suggested considering BRCA testing (no mutations, 12/2013). " Dr. Puente recommended: Cisplatin 25 mg/m2 every 3 weeks x4 cycles, otherwise observation with treatment at the time of relapse. Dr. Puente agrees that she is at high risk for relapse.   3.  Observe.   4.  Continue ongoing management per primary care physician and other specialists.   5. Advance Directive discussed.   6. Return to the [place] office in 24 weeks with pre-office CMP, CEA, CA27-29, and CBC with differential.    MEDICAL DECISION MAKING: Low Complexity   AMOUNT OF DATA: Moderate     TIME SPENT: Face-to-face time on this encounter, as defined by the American Medical Association in the 2019 Current Procedural Terminology codebook; assessment, record review, lab review, planning and education - 22 minutes (> 50% face to face).     cc: MD Evi Mast MD Patricia Williams, MD Peter Locken, MD

## 2019-12-17 ENCOUNTER — OFFICE VISIT (OUTPATIENT)
Dept: ONCOLOGY | Facility: CLINIC | Age: 49
End: 2019-12-17

## 2019-12-17 VITALS
OXYGEN SATURATION: 97 % | SYSTOLIC BLOOD PRESSURE: 138 MMHG | TEMPERATURE: 97.4 F | BODY MASS INDEX: 47.66 KG/M2 | WEIGHT: 269 LBS | RESPIRATION RATE: 16 BRPM | HEIGHT: 63 IN | DIASTOLIC BLOOD PRESSURE: 94 MMHG | HEART RATE: 102 BPM

## 2019-12-17 DIAGNOSIS — Z17.1 MALIGNANT NEOPLASM OF RIGHT BREAST IN FEMALE, ESTROGEN RECEPTOR NEGATIVE, UNSPECIFIED SITE OF BREAST (HCC): Primary | ICD-10-CM

## 2019-12-17 DIAGNOSIS — C50.911 MALIGNANT NEOPLASM OF RIGHT BREAST IN FEMALE, ESTROGEN RECEPTOR NEGATIVE, UNSPECIFIED SITE OF BREAST (HCC): Primary | ICD-10-CM

## 2019-12-17 PROCEDURE — 99213 OFFICE O/P EST LOW 20 MIN: CPT | Performed by: INTERNAL MEDICINE

## 2019-12-17 RX ORDER — LEVOTHYROXINE SODIUM 0.05 MG/1
50 TABLET ORAL
Refills: 3 | COMMUNITY
Start: 2019-11-26

## 2019-12-17 RX ORDER — ATORVASTATIN CALCIUM 40 MG/1
40 TABLET, FILM COATED ORAL DAILY
Refills: 3 | COMMUNITY
Start: 2019-11-26

## 2019-12-17 RX ORDER — AMLODIPINE BESYLATE 5 MG/1
5 TABLET ORAL DAILY
Refills: 3 | COMMUNITY
Start: 2019-11-26

## 2020-06-25 ENCOUNTER — LAB (OUTPATIENT)
Dept: LAB | Facility: HOSPITAL | Age: 50
End: 2020-06-25

## 2020-06-25 DIAGNOSIS — C50.919 MALIGNANT NEOPLASM OF FEMALE BREAST, UNSPECIFIED ESTROGEN RECEPTOR STATUS, UNSPECIFIED LATERALITY, UNSPECIFIED SITE OF BREAST (HCC): ICD-10-CM

## 2020-06-25 LAB
ALBUMIN SERPL-MCNC: 4.4 G/DL (ref 3.5–5.2)
ALBUMIN/GLOB SERPL: 1.4 G/DL
ALP SERPL-CCNC: 102 U/L (ref 39–117)
ALT SERPL W P-5'-P-CCNC: 13 U/L (ref 1–33)
ANION GAP SERPL CALCULATED.3IONS-SCNC: 13 MMOL/L (ref 5–15)
AST SERPL-CCNC: 6 U/L (ref 1–32)
BASOPHILS # BLD AUTO: 0.08 10*3/MM3 (ref 0–0.2)
BASOPHILS NFR BLD AUTO: 0.6 % (ref 0–1.5)
BILIRUB SERPL-MCNC: 0.3 MG/DL (ref 0.2–1.2)
BUN BLD-MCNC: 22 MG/DL (ref 6–20)
BUN/CREAT SERPL: 28.9 (ref 7–25)
CALCIUM SPEC-SCNC: 10.1 MG/DL (ref 8.6–10.5)
CEA SERPL-MCNC: 3.66 NG/ML
CHLORIDE SERPL-SCNC: 101 MMOL/L (ref 98–107)
CO2 SERPL-SCNC: 27 MMOL/L (ref 22–29)
CREAT BLD-MCNC: 0.76 MG/DL (ref 0.57–1)
DEPRECATED RDW RBC AUTO: 43.7 FL (ref 37–54)
EOSINOPHIL # BLD AUTO: 0.12 10*3/MM3 (ref 0–0.4)
EOSINOPHIL NFR BLD AUTO: 0.9 % (ref 0.3–6.2)
ERYTHROCYTE [DISTWIDTH] IN BLOOD BY AUTOMATED COUNT: 13 % (ref 12.3–15.4)
GFR SERPL CREATININE-BSD FRML MDRD: 81 ML/MIN/1.73
GLOBULIN UR ELPH-MCNC: 3.1 GM/DL
GLUCOSE BLD-MCNC: 142 MG/DL (ref 65–99)
HCT VFR BLD AUTO: 46.4 % (ref 34–46.6)
HGB BLD-MCNC: 14.8 G/DL (ref 12–15.9)
IMM GRANULOCYTES # BLD AUTO: 0.04 10*3/MM3 (ref 0–0.05)
IMM GRANULOCYTES NFR BLD AUTO: 0.3 % (ref 0–0.5)
LYMPHOCYTES # BLD AUTO: 2.12 10*3/MM3 (ref 0.7–3.1)
LYMPHOCYTES NFR BLD AUTO: 16.3 % (ref 19.6–45.3)
MCH RBC QN AUTO: 29.4 PG (ref 26.6–33)
MCHC RBC AUTO-ENTMCNC: 31.9 G/DL (ref 31.5–35.7)
MCV RBC AUTO: 92.2 FL (ref 79–97)
MONOCYTES # BLD AUTO: 0.86 10*3/MM3 (ref 0.1–0.9)
MONOCYTES NFR BLD AUTO: 6.6 % (ref 5–12)
NEUTROPHILS # BLD AUTO: 9.79 10*3/MM3 (ref 1.7–7)
NEUTROPHILS NFR BLD AUTO: 75.3 % (ref 42.7–76)
NRBC BLD AUTO-RTO: 0 /100 WBC (ref 0–0.2)
PLATELET # BLD AUTO: 361 10*3/MM3 (ref 140–450)
PMV BLD AUTO: 10.4 FL (ref 6–12)
POTASSIUM BLD-SCNC: 4.5 MMOL/L (ref 3.5–5.2)
PROT SERPL-MCNC: 7.5 G/DL (ref 6–8.5)
RBC # BLD AUTO: 5.03 10*6/MM3 (ref 3.77–5.28)
SODIUM BLD-SCNC: 141 MMOL/L (ref 136–145)
WBC NRBC COR # BLD: 13.01 10*3/MM3 (ref 3.4–10.8)

## 2020-06-25 PROCEDURE — 82378 CARCINOEMBRYONIC ANTIGEN: CPT

## 2020-06-25 PROCEDURE — 86300 IMMUNOASSAY TUMOR CA 15-3: CPT

## 2020-06-25 PROCEDURE — 80053 COMPREHEN METABOLIC PANEL: CPT

## 2020-06-25 PROCEDURE — 85025 COMPLETE CBC W/AUTO DIFF WBC: CPT

## 2020-06-25 PROCEDURE — 36415 COLL VENOUS BLD VENIPUNCTURE: CPT

## 2020-06-26 LAB — CANCER AG27-29 SERPL-ACNC: 22.1 U/ML (ref 0–38.6)

## 2020-07-02 NOTE — PROGRESS NOTES
MGW ONC [facility] ONCOLOGY  [address] [place] [address] [address]  [facility] [address]  [phone]    Patient Name: Gopal Powers  Encounter Date: 07/07/2020  YOB: 1970  Patient Number: [id number]    REASON FOR VISIT:  Ms. Gopal Powers is a 49-year-old female who returns in follow-up of locally advanced breast cancer. She is seen 85 months since completing cycle 6 of neoadjuvant ACT chemotherapy. She underwent bilateral mastectomies on 07/30/2013 and is now 80.5 months since cycle 4 of adjuvant cisplatin. She completed adjuvant radiation nearly 72 months ago on 07/14/2014. She is here with her sister, Shayla (previously with her brother-in-law, Padilla). History is obtained from the patient who is considered to be a reliable historian.     DIAGNOSTIC ABNORMALITIES:   1. Bilateral mammogram and right breast ultrasound, 12/18/2012, [facility]: Nodule just superior and lateral to the right nipple measuring 15 x 16 mm. Another density in the upper outer quadrant measures 12 x 17 mm. Deep to this is a large nodular group of nodules that measure 3.4 x 3.6 cm. Another 3 cm nodule, probable enlarged lymph node, in the right axillary region. Ultrasound showed multiple lesions at the 11 o'clock position. One measures 2.9 x 2.4 x 2 cm. Another 1.7 x 1.4 x 1.8 cm. Another 1.9 x 1.6 x 1.9 cm (the latter predominantly solid). The 4th lesion at the 11 o'clock position measures 1.2 x 0.8 x 1.1 cm. Three nodules in the right axillary region; one measures 2.9 x 2.7 x 2.1 cm, another 1.8 x 1.3 x 1.7 cm, and a third measures 1.2 x 0.7 x 1 cm. The lesion at the 10 o'clock position in the right breast measures 1.4 x 1.2 x 1.6 cm. This appears lobulated, predominantly solid.  2. Chest x-ray, 01/04/2013: Negative chest radiograph.  3. Ultrasound-guided biopsy of the right breast masses, 01/07/2013: Large lobulated mass in the upper outer quadrant of the right breast and the  retroareolar area of the right breast. Noted was an enlarged pathologic appearing right axillary node which was not biopsied. Pathology from the specimens taken during the procedure included the biopsy from the 10 o'clock position (5.7 cm lesion) revealing infiltrating mammary carcinoma. Tumor present in virtually every biopsy. Tumor high grade (20 mitoses per high-powered field). Tumor measured 1 cm in greatest linear dimension. The specimen from the 11 o'clock position in the retroareolar area measured 1.7 cm. Infiltrating mammary carcinoma. Tumor present in virtually every biopsy. Tumor high grade. Tumor measured 1.1 cm in greatest linear dimension.  4. CT scan chest/abdomen/pelvis 01/21/2013, [facility]: Right axillary lymphadenopathy (the more anterior of 2 nodules measures 3.5 x 2.7 cm; the more posterior measures 2.6 x 2.1 cm). Skin thickening in the partially visualized right breast. Left ovary enlarged with a dermoid tumor within it. Previous cholecystectomy.  5. Labs, 02/01/2013: Hemoglobin 15.8, hematocrit 49, MCV 93.1, platelets 350,000, WBC 11.7, ANC 8.6. CMP revealed glucose 164, potassium 3.1, SGOT 6, GFR 97.1. CEA 3.2, CA27-29 of 22.9.  6. 2D echo, 02/05/2013, [facility]: Technically limited and suboptimal study. Left ventricular chamber size, wall thickness, and wall motion normal. No segmental wall motion abnormalities noted. Left ventricular (LV) systolic function normal. Ejection fraction measured at 63%. Left ventricular (LV) diastolic parameters normal. Both atria are normal size. No atrial clots or masses seen. Mitral valve leaflets normal thickness and excursion. No mitral regurgitation. Aortic valve leaflets normal thickness and excursion. Minimal aortic regurgitation. No aortic stenosis. Tricuspid valve anatomically normal. No tricuspid regurgitation. No pericardial effusion.  7. PET scan, 10/15/2013, [facility]: Abnormal uptake in the chest wall  bilaterally that appears to correspond to areas of scarring in both breasts (likely related to the recent surgery), greater on the right side with SUV max 6.9. Multiple areas of paravertebral uptake in the thorax, likely muscular or related to brown fat uptake. Mild uptake in 3 small normal sized lymph nodes in the left axilla. SUV max of the most intense uptake 2. 4.4 cm dermoid tumor in the pelvis in the midline anterior to the uterine fundus, likely arising from the left ovary. Right ovary seen separately. No other areas of pathologic uptake are seen within the neck, chest, abdomen, or pelvis.  8. BRCA1 and 2, 12/02/2012, [facility]: No mutations detected.  9. Surgical pathology, left tube and ovary, 05/29/2014, [facility]: Fallopian tube showed no significant pathologic changes. Ovary showed benign mature teratoma.  10. Peripheral blood comprehensive report, 11/16/2016: Mild absolute leukocytosis. COMMENT. Peripheral blood evaluation: Leukocytosis comprised predominantly of granulocytes with a normal complement of lymphocytes, monocytes, and platelets. Flow cytometric studies: No evidence of significant immunophenotypic aberrancies. No specific findings in the peripheral blood to suggest a cause for the patient's relative neutrophilia. Diagnosis of a myeloid stem cell disorder, if clinically suspected, is best made using bone marrow biopsy. Peripheral blood may not always be fully representative of an underlying bone marrow disorder. Clinical correlation recommended.    PREVIOUS INTERVENTIONS:   1. Neoadjuvant ACT chemotherapy, 02/18/2013 through 06/18/2013. 6 cycles. Prior cycles of chemo delayed due to shingles outbreak.  2. Right modified radical mastectomy (MRM), left prophylactic mastectomy, 07/30/2013. Dr. Mejia. Pathology revealed: 1) Breast and lymph node, right modified radical mastectomy: Poorly differentiated infiltrating ductal cell carcinoma with extensive necrosis. (Margins of resection free of  involvement). Fibrocystic changes. Fibroadenoma. Metastatic carcinoma in lymph nodes (2 out of 27 lymph nodes). 2) Breast, total left mastectomy: Nonproliferative fibrocystic changes (negative for malignancy). ER: Absent/Negative 0.81%. DE: Absent/Negative 0.09 %. HER-2: Not over-expressed. 1+  3. Adjuvant cisplatin chemotherapy, 11/06/2013 through 01/16/2014. 4 cycles.  4. Adjuvant radiation beginning 05/13/2014 through 07/14/2014 (5040 cGy with 1000 cGy boost).        Problem List Items Addressed This Visit        Other    Malignant neoplasm of right female breast (CMS/HCC) - Primary           Malignant neoplasm of right female breast (CMS/HCC)    9/2/2016 Initial Diagnosis     Malignant neoplasm of right female breast (CMS/HCC)      12/13/2019 Cancer Staged     Staging form: Breast, AJCC V7  - Clinical stage from 12/13/2019: Stage IIIA (T3(m), N2a, M0) - Signed by Ludwin Whitaker MD on 12/13/2019         PAST MEDICAL HISTORY:  ALLERGIES:  No Known Allergies  CURRENT MEDICATIONS:  Outpatient Encounter Medications as of 7/7/2020   Medication Sig Dispense Refill   • amLODIPine (NORVASC) 5 MG tablet Take 5 mg by mouth Daily.  3   • atorvastatin (LIPITOR) 40 MG tablet Take 40 mg by mouth Daily.  3   • levothyroxine (SYNTHROID, LEVOTHROID) 50 MCG tablet Take 50 mcg by mouth Every Morning Before Breakfast.  3   • metFORMIN ER (GLUCOPHAGE-XR) 500 MG 24 hr tablet      • [DISCONTINUED] metFORMIN (GLUCOPHAGE) 500 MG tablet Take 500 mg by mouth.       No facility-administered encounter medications on file as of 7/7/2020.      ADULT ILLNESSES:   Breast cancer ( ER Status: Negative; DE Status: Negative; ICD-10:C50.411 ;Malignant neoplasm of upper-outer quadrant of right female breast )   Abnormal chest x ray ( ICD-10:R93.8 ;Abnormal findings on diagnostic imaging of other specified body structures   Chronic kidney disease ( ICD-10:N18.3 ;Chronic kidney disease, stage 3 (moderate)   Disorder of breast (disorder) ( lesion;  ICD-10:D24.1 ;Benign neoplasm of right breast )   History of mastectomy ( ICD-10:Z90.11 ;Acquired absence of right breast and nipple   Hyperlipidemia ( ICD-10:E78.5 ;Hyperlipidemia, unspecified   Hypertension ( ICD-10:I10 ;Essential (primary) hypertension   Hypothyroidism ( ICD-10:E03.9 ;Hypothyroidism, unspecified   Leukocytosis ( ICD-10:D72.829 ;Elevated white blood cell count, unspecified   Mental retardation ( ICD-10:F79 ;Unspecified intellectual disabilities   Obesity ( ICD-10:E66.9 ;Obesity, unspecified   Wound dehiscence ( right mastectomy; ICD-10:T81.31xS ;Disruption of external operation (surgical) wound, not elsewhere classified, sequela )    SURGERIES:   Removal of left port and placement of right subclavian port, 03/05/2013. Dr. Mejia   Left clavicle repair of childhood fracture   Excision of breast lump: Right breast mass excision, 11/10/2011, Dr. Saini   Port removal (right subclavian) sometime 01/23/2017. Dr. Baldwin   Laparoscopic cholecystectomy, 2001, Dr. Flynn   Oophorectomy, left, 06/29/2014, Dr. Dodson   Oophorectomy, right, 05/29/2014. A 4.4 cm dermoid tumor. Pathology showed benign teratoma   Left subclavian Mediport, 02/08/2013, Dr. Mejia      ADULT ILLNESSES:  Patient Active Problem List   Diagnosis Code   • Malignant neoplasm of right female breast (CMS/HCC) C50.911   • Port catheter in place Z95.828     SURGERIES:  No past surgical history on file.  HEALTH MAINTENANCE ITEMS:  Health Maintenance Due   Topic Date Due   • MAMMOGRAM  1970   • TDAP/TD VACCINES (1 - Tdap) 07/04/1980   • ZOSTER VACCINE (1 of 2) 07/04/2019   • HEPATITIS C SCREENING  12/05/2019   • MEDICARE ANNUAL WELLNESS  12/05/2019   • PAP SMEAR  12/05/2019   • COLONOSCOPY  12/05/2019       <no information>  Last Completed Colonoscopy       Status Date      COLONOSCOPY No completions recorded          There is no immunization history on file for this patient.  Last Completed Mammogram       Status Date       "MAMMOGRAM No completions recorded            FAMILY HISTORY:  History reviewed. No pertinent family history.  SOCIAL HISTORY:  Social History     Socioeconomic History   • Marital status: Single     Spouse name: Not on file   • Number of children: Not on file   • Years of education: Not on file   • Highest education level: Not on file   Tobacco Use   • Smoking status: Never Smoker   • Smokeless tobacco: Never Used       REVIEW OF SYSTEMS:  Constitutional:   The patient's appetite is good, sister says, \"still too good, but I keep it on a leash.\" Her energy is fair. Sister says she remains sedentary. She has intentionally lost 11 pounds (had gained 3 pounds at her prior visit) since her last visit. Sister says she has been supervising her meals, \"healthier choices and smaller portions.\" She has no fevers, chills, or drenching night sweats. Her sleep habits seem appropriate.  Ear/Nose/Mouth/Throat:   She reports no ear pains, but has allergy sinus symptoms, without sore throat, nosebleeds, or sore tongue. She has no headaches. She denies any hoarseness, change in voice quality, or hemoptysis.  Ocular:   She reports no eye pain, significant change in visual acuity, double vision, or blurry vision.  Respiratory:   She has baseline exertional dyspnea but has no shortness of breath with her routine activities (sedentary). She reports no cough, no significant shortness of breathing at rest, phlegm production, or unexplained chest wall pain.  Cardiovascular:   She reports no exertional chest pain, chest pressure, or chest heaviness. She has no claudication. She reports no palpitations or symptomatic orthostasis.  Gastrointestinal:   She reports no dysphagia, nausea, vomiting, postprandial abdominal pain, bloating, cramping, change in bowel habits, or discoloration of the stool. She reports no rectal bleeding. She has not had any constipation but still has bouts of soft stools (chronic) but no overt diarrhea.  Genitourinary: " "  She reports no urinary burning, frequency, dribbling, or discoloration. She reports no need to urinate frequently through the night. She reports no difficulty controlling her bladder.  GYN:   She has no unexplained vaginal bleeding and no significant vaginal discharge.  Breasts:   She denies any more \"soreness\" from the surgical sites.  Musculoskeletal:   She reports no unexplained arthralgias, myalgias, or nighttime leg cramping.  Extremities:   She reports trouble with fluid retention in the arms but no significant leg swelling.  Endocrine:   She reports no problems with excess thirst, excessive urination, vasomotor instability, or unexplained fatigue.  Heme/Lymphatic:   She reports no unexplained bleeding, bruising, petechial rash, or swollen glands.  Skin:   She has intermittent itching or rash around the lower abdominal incision site. No recurrent shingles on her back.  Neuro:   She reports no loss of consciousness, seizures, fainting spells, or dizziness. She has no weakness of her face, arms, or legs. She has no difficulty with speech. She has no tremors.  Psych:   She seems generally satisfied with life. She denies depression. She reports no mood swings.      VITAL SIGNS: /86   Pulse 98   Temp 97 °F (36.1 °C)   Resp 16   Ht 160 cm (63\")   Wt 117 kg (258 lb 6.4 oz)   SpO2 95%   Breastfeeding No   BMI 45.77 kg/m² Body surface area is 2.15 meters squared.  Pain Score    07/07/20 0909   PainSc: 0-No pain         PHYSICAL EXAMINATION:   General:   She is a pleasant, obese but visibly slimmer and modestly-kept female who is comfortable at rest. She arrived in the exam room ambulatory. She appears to be her stated age. Her skin color is normal. ECOG PS = 0  Head/Neck:   The patient is anicteric and atraumatic. She is wearing a surgical mask.  The trachea is midline. The neck is supple without evidence of jugular venous distention or cervical adenopathy.  Eyes:   The pupils are equal, round, and " reactive to light. The extraocular movements are full. There is no scleral jaundice or erythema.  Chest:   The respiratory efforts are normal and unhindered. The chest is clear to auscultation and percussion. There are no wheezes, rhonchi, rales, or asymmetry of breath sounds.  Breasts:   Inspection of the mastectomy sites is notable for healed wounds with underlying scar thickness and overlying skin folds (left over post-op). There is no axillary adenopathy. The previous Mediport site in the left upper chest is healed. The right-sided Mediport has been removed (sometime 01/23/2017 per Dr. Baldwin) after it flipped over. The site is healed with moderate scar formation.  Cardiovascular:   The patient has a regular cardiac rate and rhythm without murmurs, rubs, or gallops. The peripheral pulses are equal and full.  Abdomen:   The belly is soft and obese. Her habitus precludes an adequate exam. There is no rebound or guarding. There is no obvious organomegaly, mass-effect, or tenderness. Bowel sounds are active and of normal character. The lower abdominal incision is healed.   Extremities:   There is evidence of arm lymphedema, right greater than left with no evidence of cyanosis or clubbing with 1+ ankle edema.  Rheumatologic:   There is no overt evidence of rheumatoid deformities of the hands. There is no sausaging of the fingers. There is no sign of active synovitis. The gait is normal.  Cutaneous:   There are no overt rashes, disseminated lesions, purpura, or petechiae.   Lymphatics:   There is no evidence of adenopathy in the cervical, supraclavicular, or axillary areas.  Neurologic:   The patient is alert, oriented, cooperative, and pleasant. She is appropriately conversant. She ambulated into the exam room without assistance and transferred from chair to exam table unassisted today. There is no overt dysfunction of the motor, sensory, or cerebellar systems.  Psych:   Mood and affect are appropriate for  circumstance. Eye contact is appropriate.        LABS    Lab Results - Last 18 Months   Lab Units 06/25/20  1025 12/10/19  1329   HEMOGLOBIN g/dL 14.8 15.6   HEMATOCRIT % 46.4 49.2*   MCV fL 92.2 95.0   WBC 10*3/mm3 13.01* 10.76   RDW % 13.0 12.8   MPV fL 10.4 10.4   PLATELETS 10*3/mm3 361 262   IMM GRAN % % 0.3 0.4   NEUTROS ABS 10*3/mm3 9.79* 8.07*   LYMPHS ABS 10*3/mm3 2.12 1.83   MONOS ABS 10*3/mm3 0.86 0.68   EOS ABS 10*3/mm3 0.12 0.07   BASOS ABS 10*3/mm3 0.08 0.07   IMMATURE GRANS (ABS) 10*3/mm3 0.04 0.04   NRBC /100 WBC 0.0  --        Lab Results - Last 18 Months   Lab Units 06/25/20  1025 12/10/19  1329 03/12/19  1600   GLUCOSE mg/dL 142* 154* 133*   SODIUM mmol/L 141 140 140   POTASSIUM mmol/L 4.5 3.9 4.7   CO2 mmol/L 27.0 27.0 26.0   CHLORIDE mmol/L 101 98 99   ANION GAP mmol/L 13.0 15.0 15.0*   CREATININE mg/dL 0.76 0.74 0.68   BUN mg/dL 22* 15 18   BUN / CREAT RATIO  28.9* 20.3 26.5*   CALCIUM mg/dL 10.1 10.0 10.6*   EGFR IF NONAFRICN AM mL/min/1.73 81 83 92   ALK PHOS U/L 102 111 115   TOTAL PROTEIN g/dL 7.5 7.9 8.6   ALT (SGPT) U/L 13 14 22   AST (SGOT) U/L 6 7 9   BILIRUBIN mg/dL 0.3 0.4 0.6   ALBUMIN g/dL 4.40 4.50 4.80   GLOBULIN gm/dL 3.1 3.4 3.8       Lab Results - Last 18 Months   Lab Units 06/25/20  1025 12/10/19  1329 03/12/19  1600   CEA ng/mL 3.66 3.80 3.52       ASSESSMENT:   1. Right breast invasive ductal carcinoma:   Stage: At least IIIA (cT3, cN2a, M0, G3), ER (-)/ID (-). HER-2/avelina (-). High grade (20 mitoses per high-powered field).   Tumor [place]: Multiple solid nodules in the right breast (multiple lesions at 11 o'clock measuring 2.9 x 2.4 x 2.2 cm, 1.7 x 1.4 x 1.8 cm, 1.9 x 1.6 x 1.9 cm, 1.2 x 0.8 x 1.1 cm); a lesion at the 10 o'clock in the right breast measuring 1.4 x 1.2 x 1.6 cm; 3 nodules in the right axillary region measuring 2.9 x 2.7 x 2.1 cm, another 1.8 x 1.3 x 1.7 cm, and a third measuring 1.2 x 0.7 x 1 cm.   Complications of Tumor: Pain and fullness of the right breast  "and right axilla.   BRCA status (1 and 2): No mutations, 12/2013.   Tumor Status: Underwent neoadjuvant chemotherapy followed by bilateral mastectomies, 07/30/2013. Pathology revealed: 1. Breast and lymph node, right modified radical mastectomy: Poorly differentiated infiltrating ductal cell carcinoma with extensive necrosis. (Margins of resection free of involvement). Fibrocystic changes. Fibroadenoma. Metastatic carcinoma in lymph nodes (2 out of 27 lymph nodes). 2. Breast, total left mastectomy: Nonproliferative fibrocystic changes (negative for malignancy). Adjuvant cisplatin and radiation completed. LENARD.  2. Hypercalcemia, mild. Has stopped Diovan/HCTZ. Resolved  3. Mental retardation.   4. Mild neutrophilic leukocytosis, NOS. Flow cytometry, 11/16/2016 (above) unrevealing.  No localizing signs/symptoms.  Observation warranted.  5. Borderline erythrocytosis.   6. Lymphedema both arms. Asymptomatic.   7. Hypothyroidism.   8. Mildly abnormal CEA. Stable, 3.66 on 06/25/2020 (prior range: 2.2 - 5.2).   9. Obesity.   10. Chronic kidney disease, GFR 57.35 mL/min, 01/27/2014, and 02/12/2014. Stable, GFR 81 mL/min, 06/25/2020 (prior range: 51 - 92 mL/min).   11. Abnormal chest x-ray, 02/06/2015. Atelectasis versus small area of pneumonia.   a. Repeat chest x-ray 04/06/2015 was essentially unchanged, PCP says \"everything okay.\"   b. Chest x-ray, 10/16/2015 and 03/11/2016 at [facility]: No active disease.   c. [facility], Chest x-ray, 06/09/2016. Impression: Chronic elevation of the right diaphragm. No acute cardiac or pulmonary abnormality is identified.   d. Chest x-ray performed at [facility] on 4/28/2017. Impression: Chronic elevation of the right diaphragm. No acute cardiac or pulmonary abnormality is identified.       12.  Hyperglycemia.  On metformin.    PLAN:  1.  Re: Apprised of labs from 06/25/2020 with mild neutrophilic leukocytosis otherwise stable CBC, including no " normal MCV without anemia, normal platelets, mild hyperglycemia, normocalcemia with otherwise normal CMP, elevated (stable) CEA, and normal CA27-29. Consider restaging scans if CEA continues to rise.   2. Remind them of the plans as outlined by Dr. Puente on 09/30/2013 (date she saw the patient). Dr. Puente suggested a PET/CT (above) and suggested considering BRCA testing (no mutations, 12/2013). Dr. Puente recommended: Cisplatin 25 mg/m2 every 3 weeks x4 cycles, otherwise observation with treatment at the time of relapse. Dr. Puente agrees that she is at high risk for relapse.   3.  Observe.   4.  Continue ongoing management per primary care physician and other specialists.   5.  Advance Directive discussed.   6.  Return to the [place] office in 24 weeks with pre-office CMP, CEA, CA27-29, and CBC with differential.    MEDICAL DECISION MAKING: Moderate Complexity   AMOUNT OF DATA: Moderate     I spent 28 total minutes, face-to-face, caring for Gopal today.  Greater than 50% of this time involved counseling and/or coordination of care as documented within this note regarding the patient's illness(es), pros and cons of various treatment options, instructions and/or risk reduction.    cc: MD Evi Mast MD Patricia Williams, MD Peter Locken, MD

## 2020-07-07 ENCOUNTER — OFFICE VISIT (OUTPATIENT)
Dept: ONCOLOGY | Facility: CLINIC | Age: 50
End: 2020-07-07

## 2020-07-07 VITALS
SYSTOLIC BLOOD PRESSURE: 138 MMHG | HEART RATE: 98 BPM | DIASTOLIC BLOOD PRESSURE: 86 MMHG | BODY MASS INDEX: 45.79 KG/M2 | RESPIRATION RATE: 16 BRPM | WEIGHT: 258.4 LBS | TEMPERATURE: 97 F | HEIGHT: 63 IN | OXYGEN SATURATION: 95 %

## 2020-07-07 DIAGNOSIS — C50.911 MALIGNANT NEOPLASM OF RIGHT BREAST IN FEMALE, ESTROGEN RECEPTOR NEGATIVE, UNSPECIFIED SITE OF BREAST (HCC): Primary | ICD-10-CM

## 2020-07-07 DIAGNOSIS — Z17.1 MALIGNANT NEOPLASM OF RIGHT BREAST IN FEMALE, ESTROGEN RECEPTOR NEGATIVE, UNSPECIFIED SITE OF BREAST (HCC): Primary | ICD-10-CM

## 2020-07-07 PROCEDURE — 99214 OFFICE O/P EST MOD 30 MIN: CPT | Performed by: INTERNAL MEDICINE

## 2020-07-07 RX ORDER — METFORMIN HYDROCHLORIDE 500 MG/1
TABLET, EXTENDED RELEASE ORAL
COMMUNITY
Start: 2020-05-26

## 2020-12-28 ENCOUNTER — LAB (OUTPATIENT)
Dept: LAB | Facility: HOSPITAL | Age: 50
End: 2020-12-28

## 2020-12-28 DIAGNOSIS — Z17.1 MALIGNANT NEOPLASM OF RIGHT BREAST IN FEMALE, ESTROGEN RECEPTOR NEGATIVE, UNSPECIFIED SITE OF BREAST (HCC): ICD-10-CM

## 2020-12-28 DIAGNOSIS — C50.911 MALIGNANT NEOPLASM OF RIGHT BREAST IN FEMALE, ESTROGEN RECEPTOR NEGATIVE, UNSPECIFIED SITE OF BREAST (HCC): ICD-10-CM

## 2020-12-28 LAB
ALBUMIN SERPL-MCNC: 4.2 G/DL (ref 3.5–5.2)
ALBUMIN/GLOB SERPL: 1.3 G/DL
ALP SERPL-CCNC: 103 U/L (ref 39–117)
ALT SERPL W P-5'-P-CCNC: 11 U/L (ref 1–33)
ANION GAP SERPL CALCULATED.3IONS-SCNC: 9 MMOL/L (ref 5–15)
AST SERPL-CCNC: 7 U/L (ref 1–32)
BASOPHILS # BLD AUTO: 0.06 10*3/MM3 (ref 0–0.2)
BASOPHILS NFR BLD AUTO: 0.5 % (ref 0–1.5)
BILIRUB SERPL-MCNC: 0.6 MG/DL (ref 0–1.2)
BUN SERPL-MCNC: 11 MG/DL (ref 6–20)
BUN/CREAT SERPL: 17.5 (ref 7–25)
CALCIUM SPEC-SCNC: 9.1 MG/DL (ref 8.6–10.5)
CEA SERPL-MCNC: 3.97 NG/ML
CHLORIDE SERPL-SCNC: 102 MMOL/L (ref 98–107)
CO2 SERPL-SCNC: 27 MMOL/L (ref 22–29)
CREAT SERPL-MCNC: 0.63 MG/DL (ref 0.57–1)
DEPRECATED RDW RBC AUTO: 45.8 FL (ref 37–54)
EOSINOPHIL # BLD AUTO: 0.06 10*3/MM3 (ref 0–0.4)
EOSINOPHIL NFR BLD AUTO: 0.5 % (ref 0.3–6.2)
ERYTHROCYTE [DISTWIDTH] IN BLOOD BY AUTOMATED COUNT: 13.5 % (ref 12.3–15.4)
GFR SERPL CREATININE-BSD FRML MDRD: 100 ML/MIN/1.73
GLOBULIN UR ELPH-MCNC: 3.2 GM/DL
GLUCOSE SERPL-MCNC: 206 MG/DL (ref 65–99)
HCT VFR BLD AUTO: 44.3 % (ref 34–46.6)
HGB BLD-MCNC: 15 G/DL (ref 12–15.9)
IMM GRANULOCYTES # BLD AUTO: 0.06 10*3/MM3 (ref 0–0.05)
IMM GRANULOCYTES NFR BLD AUTO: 0.5 % (ref 0–0.5)
LYMPHOCYTES # BLD AUTO: 1.72 10*3/MM3 (ref 0.7–3.1)
LYMPHOCYTES NFR BLD AUTO: 14.6 % (ref 19.6–45.3)
MCH RBC QN AUTO: 31.3 PG (ref 26.6–33)
MCHC RBC AUTO-ENTMCNC: 33.9 G/DL (ref 31.5–35.7)
MCV RBC AUTO: 92.5 FL (ref 79–97)
MONOCYTES # BLD AUTO: 0.77 10*3/MM3 (ref 0.1–0.9)
MONOCYTES NFR BLD AUTO: 6.5 % (ref 5–12)
NEUTROPHILS NFR BLD AUTO: 77.4 % (ref 42.7–76)
NEUTROPHILS NFR BLD AUTO: 9.11 10*3/MM3 (ref 1.7–7)
NRBC BLD AUTO-RTO: 0 /100 WBC (ref 0–0.2)
PLATELET # BLD AUTO: 313 10*3/MM3 (ref 140–450)
PMV BLD AUTO: 10.5 FL (ref 6–12)
POTASSIUM SERPL-SCNC: 3.4 MMOL/L (ref 3.5–5.2)
PROT SERPL-MCNC: 7.4 G/DL (ref 6–8.5)
RBC # BLD AUTO: 4.79 10*6/MM3 (ref 3.77–5.28)
SODIUM SERPL-SCNC: 138 MMOL/L (ref 136–145)
WBC # BLD AUTO: 11.78 10*3/MM3 (ref 3.4–10.8)

## 2020-12-28 PROCEDURE — 85025 COMPLETE CBC W/AUTO DIFF WBC: CPT

## 2020-12-28 PROCEDURE — 82378 CARCINOEMBRYONIC ANTIGEN: CPT

## 2020-12-28 PROCEDURE — 80053 COMPREHEN METABOLIC PANEL: CPT

## 2020-12-28 PROCEDURE — 86300 IMMUNOASSAY TUMOR CA 15-3: CPT

## 2020-12-28 PROCEDURE — 36415 COLL VENOUS BLD VENIPUNCTURE: CPT

## 2020-12-29 LAB — CANCER AG27-29 SERPL-ACNC: 22.4 U/ML (ref 0–38.6)

## 2020-12-29 NOTE — TELEPHONE ENCOUNTER
----- Message from Ludwin Whitaker MD sent at 12/28/2020  2:03 PM CST -----  Labs 12/28/2020–glucose 206, potassium 3.4.  Please: Fax these labs to her PCP Re: Poorly controlled hyperglycemia, and: K-Dur 20 mEq p.o. 3 times daily x3 days.  Thank you

## 2020-12-30 RX ORDER — POTASSIUM CHLORIDE 20 MEQ/1
20 TABLET, EXTENDED RELEASE ORAL 3 TIMES DAILY
Qty: 9 TABLET | Refills: 0 | Status: SHIPPED | OUTPATIENT
Start: 2020-12-30 | End: 2021-01-02

## 2020-12-30 NOTE — TELEPHONE ENCOUNTER
Spoke with Shayla and let her know about the low potassium and high glucose. Shayla asks that the script be sent to [facility] in [place].

## 2021-01-05 ENCOUNTER — TELEPHONE (OUTPATIENT)
Dept: ONCOLOGY | Facility: CLINIC | Age: 51
End: 2021-01-05

## 2021-01-05 NOTE — TELEPHONE ENCOUNTER
Caller: Gopal    Relationship to patient: Pt    Best call back number: [phone]     Type of visit: Follow up     Requested date: 01/11 @ 8:30am    If rescheduling, when is the original appointment: 01/07

## 2021-01-13 NOTE — PROGRESS NOTES
[facility] ONC [facility] ONCOLOGY  [address] [place] [address] [address]  [facility] [id number]  [phone]    Patient Name: Gopal Powers  Encounter Date: 01/15/2021  YOB: 1970  Patient Number: [id number]    REASON FOR VISIT:  Ms. Gopal Powers is a 50-year-old female who returns in follow-up of locally advanced breast cancer. She is seen 91 months since completing cycle 6 of neoadjuvant ACT chemotherapy. She underwent bilateral mastectomies on 07/30/2013 and is now 86.5 months since cycle 4 of adjuvant cisplatin. She completed adjuvant radiation nearly 78 months ago on 07/14/2014. She is here with her sister, Shayla (previously with her brother-in-law, Padilla).     DIAGNOSTIC ABNORMALITIES:   1. Bilateral mammogram and right breast ultrasound, 12/18/2012, [facility]: Nodule just superior and lateral to the right nipple measuring 15 x 16 mm. Another density in the upper outer quadrant measures 12 x 17 mm. Deep to this is a large nodular group of nodules that measure 3.4 x 3.6 cm. Another 3 cm nodule, probable enlarged lymph node, in the right axillary region. Ultrasound showed multiple lesions at the 11 o'clock position. One measures 2.9 x 2.4 x 2 cm. Another 1.7 x 1.4 x 1.8 cm. Another 1.9 x 1.6 x 1.9 cm (the latter predominantly solid). The 4th lesion at the 11 o'clock position measures 1.2 x 0.8 x 1.1 cm. Three nodules in the right axillary region; one measures 2.9 x 2.7 x 2.1 cm, another 1.8 x 1.3 x 1.7 cm, and a third measures 1.2 x 0.7 x 1 cm. The lesion at the 10 o'clock position in the right breast measures 1.4 x 1.2 x 1.6 cm. This appears lobulated, predominantly solid.  2. Chest x-ray, 01/04/2013: Negative chest radiograph.  3. Ultrasound-guided biopsy of the right breast masses, 01/07/2013: Large lobulated mass in the upper outer quadrant of the right breast and the retroareolar area of the right breast. Noted was an enlarged pathologic appearing right  axillary node which was not biopsied. Pathology from the specimens taken during the procedure included the biopsy from the 10 o'clock position (5.7 cm lesion) revealing infiltrating mammary carcinoma. Tumor present in virtually every biopsy. Tumor high grade (20 mitoses per high-powered field). Tumor measured 1 cm in greatest linear dimension. The specimen from the 11 o'clock position in the retroareolar area measured 1.7 cm. Infiltrating mammary carcinoma. Tumor present in virtually every biopsy. Tumor high grade. Tumor measured 1.1 cm in greatest linear dimension.  4. CT scan chest/abdomen/pelvis 01/21/2013, [facility]: Right axillary lymphadenopathy (the more anterior of 2 nodules measures 3.5 x 2.7 cm; the more posterior measures 2.6 x 2.1 cm). Skin thickening in the partially visualized right breast. Left ovary enlarged with a dermoid tumor within it. Previous cholecystectomy.  5. Labs, 02/01/2013: Hemoglobin 15.8, hematocrit 49, MCV 93.1, platelets 350,000, WBC 11.7, ANC 8.6. CMP revealed glucose 164, potassium 3.1, SGOT 6, GFR 97.1. CEA 3.2, CA27-29 of 22.9.  6. 2D echo, 02/05/2013, [facility]: Technically limited and suboptimal study. Left ventricular chamber size, wall thickness, and wall motion normal. No segmental wall motion abnormalities noted. Left ventricular (LV) systolic function normal. Ejection fraction measured at 63%. Left ventricular (LV) diastolic parameters normal. Both atria are normal size. No atrial clots or masses seen. Mitral valve leaflets normal thickness and excursion. No mitral regurgitation. Aortic valve leaflets normal thickness and excursion. Minimal aortic regurgitation. No aortic stenosis. Tricuspid valve anatomically normal. No tricuspid regurgitation. No pericardial effusion.  7. PET scan, 10/15/2013, [facility]: Abnormal uptake in the chest wall bilaterally that appears to correspond to areas of scarring in both breasts (likely related  to the recent surgery), greater on the right side with SUV max 6.9. Multiple areas of paravertebral uptake in the thorax, likely muscular or related to brown fat uptake. Mild uptake in 3 small normal sized lymph nodes in the left axilla. SUV max of the most intense uptake 2. 4.4 cm dermoid tumor in the pelvis in the midline anterior to the uterine fundus, likely arising from the left ovary. Right ovary seen separately. No other areas of pathologic uptake are seen within the neck, chest, abdomen, or pelvis.  8. BRCA1 and 2, 12/02/2012, [facility]: No mutations detected.  9. Surgical pathology, left tube and ovary, 05/29/2014, [facility]: Fallopian tube showed no significant pathologic changes. Ovary showed benign mature teratoma.  10. Peripheral blood comprehensive report, 11/16/2016: Mild absolute leukocytosis. COMMENT. Peripheral blood evaluation: Leukocytosis comprised predominantly of granulocytes with a normal complement of lymphocytes, monocytes, and platelets. Flow cytometric studies: No evidence of significant immunophenotypic aberrancies. No specific findings in the peripheral blood to suggest a cause for the patient's relative neutrophilia. Diagnosis of a myeloid stem cell disorder, if clinically suspected, is best made using bone marrow biopsy. Peripheral blood may not always be fully representative of an underlying bone marrow disorder. Clinical correlation recommended.    PREVIOUS INTERVENTIONS:   1. Neoadjuvant ACT chemotherapy, 02/18/2013 through 06/18/2013. 6 cycles. Prior cycles of chemo delayed due to shingles outbreak.  2. Right modified radical mastectomy (MRM), left prophylactic mastectomy, 07/30/2013. Dr. Mejia. Pathology revealed: 1) Breast and lymph node, right modified radical mastectomy: Poorly differentiated infiltrating ductal cell carcinoma with extensive necrosis. (Margins of resection free of involvement). Fibrocystic changes. Fibroadenoma. Metastatic carcinoma in lymph nodes (2 out  of 27 lymph nodes). 2) Breast, total left mastectomy: Nonproliferative fibrocystic changes (negative for malignancy). ER: Absent/Negative 0.81%. WV: Absent/Negative 0.09 %. HER-2: Not over-expressed. 1+  3. Adjuvant cisplatin chemotherapy, 11/06/2013 through 01/16/2014. 4 cycles.  4. Adjuvant radiation beginning 05/13/2014 through 07/14/2014 (5040 cGy with 1000 cGy boost).        Problem List Items Addressed This Visit     None        Oncology/Hematology History   Malignant neoplasm of right female breast (CMS/HCC)   9/2/2016 Initial Diagnosis    Malignant neoplasm of right female breast (CMS/HCC)     12/13/2019 Cancer Staged    Staging form: Breast, AJCC V7  - Clinical stage from 12/13/2019: Stage IIIA (T3(m), N2a, M0) - Signed by Ludwin Whitaker MD on 12/13/2019         PAST MEDICAL HISTORY:  ALLERGIES:  No Known Allergies  CURRENT MEDICATIONS:  Outpatient Encounter Medications as of 1/15/2021   Medication Sig Dispense Refill   • amLODIPine (NORVASC) 5 MG tablet Take 5 mg by mouth Daily.  3   • atorvastatin (LIPITOR) 40 MG tablet Take 40 mg by mouth Daily.  3   • levothyroxine (SYNTHROID, LEVOTHROID) 50 MCG tablet Take 50 mcg by mouth Every Morning Before Breakfast.  3   • metFORMIN ER (GLUCOPHAGE-XR) 500 MG 24 hr tablet        No facility-administered encounter medications on file as of 1/15/2021.      ADULT ILLNESSES:   Breast cancer ( ER Status: Negative; WV Status: Negative; ICD-10:C50.411 ;Malignant neoplasm of upper-outer quadrant of right female breast )   Abnormal chest x ray ( ICD-10:R93.8 ;Abnormal findings on diagnostic imaging of other specified body structures   Chronic kidney disease ( ICD-10:N18.3 ;Chronic kidney disease, stage 3 (moderate)   Disorder of breast (disorder) ( lesion; ICD-10:D24.1 ;Benign neoplasm of right breast )   History of mastectomy ( ICD-10:Z90.11 ;Acquired absence of right breast and nipple   Hyperlipidemia ( ICD-10:E78.5 ;Hyperlipidemia, unspecified   Hypertension (  ICD-10:I10 ;Essential (primary) hypertension   Hypothyroidism ( ICD-10:E03.9 ;Hypothyroidism, unspecified   Leukocytosis ( ICD-10:D72.829 ;Elevated white blood cell count, unspecified   Mental retardation ( ICD-10:F79 ;Unspecified intellectual disabilities   Obesity ( ICD-10:E66.9 ;Obesity, unspecified   Wound dehiscence ( right mastectomy; ICD-10:T81.31xS ;Disruption of external operation (surgical) wound, not elsewhere classified, sequela )    SURGERIES:   Removal of left port and placement of right subclavian port, 03/05/2013. Dr. Mejia   Left clavicle repair of childhood fracture   Excision of breast lump: Right breast mass excision, 11/10/2011, Dr. Saini   Port removal (right subclavian) sometime 01/23/2017. Dr. Baldwin   Laparoscopic cholecystectomy, 2001, Dr. Flynn   Oophorectomy, left, 06/29/2014, Dr. Dodson   Oophorectomy, right, 05/29/2014. A 4.4 cm dermoid tumor. Pathology showed benign teratoma   Left subclavian Mediport, 02/08/2013, Dr. Mejia      ADULT ILLNESSES:  Patient Active Problem List   Diagnosis Code   • Malignant neoplasm of right female breast (CMS/HCC) C50.911   • Port catheter in place Z95.828     SURGERIES:  No past surgical history on file.  HEALTH MAINTENANCE ITEMS:  Health Maintenance Due   Topic Date Due   • MAMMOGRAM  1970   • URINE MICROALBUMIN  1970   • COLONOSCOPY  1970   • Pneumococcal Vaccine 0-64 (1 of 1 - PPSV23) 07/04/1975   • TDAP/TD VACCINES (1 - Tdap) 07/04/1988   • ZOSTER VACCINE (1 of 2) 07/04/2019   • HEPATITIS C SCREENING  12/05/2019   • DIABETIC FOOT EXAM  12/05/2019   • PAP SMEAR  12/05/2019   • HEMOGLOBIN A1C  12/05/2019   • DIABETIC EYE EXAM  12/05/2019   • INFLUENZA VACCINE  08/01/2020       <no information>  Last Completed Colonoscopy       Status Date      COLONOSCOPY No completions recorded          There is no immunization history on file for this patient.  Last Completed Mammogram       Status Date      MAMMOGRAM No completions  "recorded            FAMILY HISTORY:  History reviewed. No pertinent family history.  SOCIAL HISTORY:  Social History     Socioeconomic History   • Marital status: Single     Spouse name: Not on file   • Number of children: Not on file   • Years of education: Not on file   • Highest education level: Not on file   Tobacco Use   • Smoking status: Never Smoker   • Smokeless tobacco: Never Used       REVIEW OF SYSTEMS:  Constitutional:   The patient's appetite is good, sister says, \"I keep it on a leash.\" Her energy is fairly good, sister says, \"She's doing great.\" Sister says she remains sedentary. She has intentionally lost 9 pounds (in addition to 11 pounds at her prior visit) since her last visit. Sister says she has been supervising her meals, \"still with the healthier choices and smaller portions.\" She has no fevers, chills, or drenching night sweats. Her sleep habits seem appropriate.  Ear/Nose/Mouth/Throat:   She reports no ear pains, but has allergy sinus symptoms, without sore throat, nosebleeds, or sore tongue. She has no headaches. She denies any hoarseness, change in voice quality, or hemoptysis.  Ocular:   She reports no eye pain, significant change in visual acuity, double vision, or blurry vision.  Respiratory:   She has baseline exertional dyspnea but has no shortness of breath with her routine activities (sedentary). She reports no cough, no significant shortness of breathing at rest, phlegm production, or unexplained chest wall pain.  Cardiovascular:   She reports no exertional chest pain, chest pressure, or chest heaviness. She has no claudication. She reports no palpitations or symptomatic orthostasis.  Gastrointestinal:   She reports no dysphagia, nausea, vomiting, postprandial abdominal pain, bloating, cramping, change in bowel habits, or discoloration of the stool. She reports no rectal bleeding. She has not had any constipation but still has bouts of soft stools (chronic) but no overt " "diarrhea.  Genitourinary:   She reports no urinary burning, frequency, dribbling, or discoloration. She reports no need to urinate frequently through the night. She reports no difficulty controlling her bladder.  GYN:   She has no unexplained vaginal bleeding and no significant vaginal discharge.  Breasts:   She denies any more \"soreness\" from the surgical sites.  Musculoskeletal:   She reports no unexplained arthralgias, myalgias, or nighttime leg cramping.  Extremities:   She reports trouble with fluid retention in the arms but no significant leg swelling.  Endocrine:   She reports no problems with excess thirst, excessive urination, vasomotor instability, or unexplained fatigue.  Heme/Lymphatic:   She reports no unexplained bleeding, bruising, petechial rash, or swollen glands.  Skin:   She has intermittent itching or rash around the lower abdominal incision site. No recurrent shingles on her back.  Neuro:   She reports no loss of consciousness, seizures, fainting spells, or dizziness. She has no weakness of her face, arms, or legs. She has no difficulty with speech. She has no tremors.  Psych:   She seems generally satisfied with life. She denies depression. She reports no mood swings.      VITAL SIGNS: /84   Pulse 108   Temp 97.6 °F (36.4 °C)   Resp 16   Ht 160 cm (63\")   Wt 112 kg (247 lb 6.4 oz)   SpO2 95%   Breastfeeding No   BMI 43.82 kg/m² Body surface area is 2.11 meters squared.  Pain Score    01/15/21 0858   PainSc: 0-No pain         PHYSICAL EXAMINATION:   General:   She is a pleasant, obese but visibly slimmer and modestly-kept female who is comfortable at rest. She arrived in the exam room ambulatory. She appears to be her stated age. Her skin color is normal. ECOG PS = 0  Head/Neck:   The patient is anicteric and atraumatic. She is wearing a surgical mask.  The trachea is midline. The neck is supple without evidence of jugular venous distention or cervical adenopathy.  Eyes:   The " pupils are equal, round, and reactive to light. The extraocular movements are full. There is no scleral jaundice or erythema.  Chest:   The respiratory efforts are normal and unhindered. The chest is clear to auscultation and percussion. There are no wheezes, rhonchi, rales, or asymmetry of breath sounds.  Breasts:   Inspection of the mastectomy sites is again notable for healed wounds with underlying scar thickness and overlying skin folds (left over post-op). There is no axillary adenopathy. The previous Mediport site in the left upper chest is healed. The right-sided Mediport has been removed (sometime 01/23/2017 per Dr. Baldwin) after it flipped over. The site is healed with moderate scar formation.  Cardiovascular:   The patient has a regular cardiac rate and rhythm without murmurs, rubs, or gallops. The peripheral pulses are equal and full.  Abdomen:   The belly is soft and obese. Her habitus precludes an adequate exam. There is no rebound or guarding. There is no obvious organomegaly, mass-effect, or tenderness. Bowel sounds are active and of normal character. The lower abdominal incision is healed.   Extremities:   There is evidence of arm lymphedema, right greater than left with no evidence of cyanosis or clubbing with 1+ ankle edema.  Rheumatologic:   There is no overt evidence of rheumatoid deformities of the hands. There is no sausaging of the fingers. There is no sign of active synovitis. The gait is slow but independent.  Cutaneous:   There are no overt rashes, disseminated lesions, purpura, or petechiae.   Lymphatics:   There is no evidence of adenopathy in the cervical, supraclavicular, or axillary areas.  Neurologic:   The patient is alert, oriented, cooperative, and pleasant. She is appropriately conversant. She ambulated into the exam room without assistance and transferred from chair to exam table unassisted today. There is no overt dysfunction of the motor, sensory, or cerebellar  systems.  Psych:   Mood and affect are appropriate for circumstance. Eye contact is appropriate.        LABS    Lab Results - Last 18 Months   Lab Units 12/28/20  1237 06/25/20  1025 12/10/19  1329   HEMOGLOBIN g/dL 15.0 14.8 15.6   HEMATOCRIT % 44.3 46.4 49.2*   MCV fL 92.5 92.2 95.0   WBC 10*3/mm3 11.78* 13.01* 10.76   RDW % 13.5 13.0 12.8   MPV fL 10.5 10.4 10.4   PLATELETS 10*3/mm3 313 361 262   IMM GRAN % % 0.5 0.3 0.4   NEUTROS ABS 10*3/mm3 9.11* 9.79* 8.07*   LYMPHS ABS 10*3/mm3 1.72 2.12 1.83   MONOS ABS 10*3/mm3 0.77 0.86 0.68   EOS ABS 10*3/mm3 0.06 0.12 0.07   BASOS ABS 10*3/mm3 0.06 0.08 0.07   IMMATURE GRANS (ABS) 10*3/mm3 0.06* 0.04 0.04   NRBC /100 WBC 0.0 0.0  --        Lab Results - Last 18 Months   Lab Units 12/28/20  1237 06/25/20  1025 12/10/19  1329   GLUCOSE mg/dL 206* 142* 154*   SODIUM mmol/L 138 141 140   POTASSIUM mmol/L 3.4* 4.5 3.9   CO2 mmol/L 27.0 27.0 27.0   CHLORIDE mmol/L 102 101 98   ANION GAP mmol/L 9.0 13.0 15.0   CREATININE mg/dL 0.63 0.76 0.74   BUN mg/dL 11 22* 15   BUN / CREAT RATIO  17.5 28.9* 20.3   CALCIUM mg/dL 9.1 10.1 10.0   EGFR IF NONAFRICN AM mL/min/1.73 100 81 83   ALK PHOS U/L 103 102 111   TOTAL PROTEIN g/dL 7.4 7.5 7.9   ALT (SGPT) U/L 11 13 14   AST (SGOT) U/L 7 6 7   BILIRUBIN mg/dL 0.6 0.3 0.4   ALBUMIN g/dL 4.20 4.40 4.50   GLOBULIN gm/dL 3.2 3.1 3.4       Lab Results - Last 18 Months   Lab Units 12/28/20  1237 06/25/20  1025 12/10/19  1329   CEA ng/mL 3.97 3.66 3.80       ASSESSMENT:   1. Right breast invasive ductal carcinoma:   Stage: At least IIIA (cT3, cN2a, M0, G3), ER (-)/DE (-). HER-2/[place]ina (-). High grade (20 mitoses per high-powered field).   Tumor [place]: Multiple solid nodules in the right breast (multiple lesions at 11 o'clock measuring 2.9 x 2.4 x 2.2 cm, 1.7 x 1.4 x 1.8 cm, 1.9 x 1.6 x 1.9 cm, 1.2 x 0.8 x 1.1 cm); a lesion at the 10 o'clock in the right breast measuring 1.4 x 1.2 x 1.6 cm; 3 nodules in the right axillary region measuring 2.9 x 2.7  "x 2.1 cm, another 1.8 x 1.3 x 1.7 cm, and a third measuring 1.2 x 0.7 x 1 cm.   Complications of Tumor: Pain and fullness of the right breast and right axilla.   BRCA status (1 and 2): No mutations, 12/2013.   Tumor Status: Underwent neoadjuvant chemotherapy followed by bilateral mastectomies, 07/30/2013. Pathology revealed: 1. Breast and lymph node, right modified radical mastectomy: Poorly differentiated infiltrating ductal cell carcinoma with extensive necrosis. (Margins of resection free of involvement). Fibrocystic changes. Fibroadenoma. Metastatic carcinoma in lymph nodes (2 out of 27 lymph nodes). 2. Breast, total left mastectomy: Nonproliferative fibrocystic changes (negative for malignancy). Adjuvant cisplatin and radiation completed. LENARD.  2. Mental retardation.   3. Mild neutrophilic leukocytosis, NOS. Flow cytometry, 11/16/2016 (above) unrevealing.  No localizing signs/symptoms.  Observation warranted.  4. Borderline erythrocytosis.   5. Lymphedema both arms. Asymptomatic.   6. Hypothyroidism.   7. Mildly abnormal CEA. Stable, 3.97 on 12/28/2020 (prior range: 2.2 - 5.2).   8. Obesity.   9. Chronic kidney disease, GFR 57.35 mL/min, 01/27/2014, and 02/12/2014. Stable,  mL/min, 12/28/2020 (prior range: 51 - 92 mL/min).   10. Abnormal chest x-ray, 02/06/2015. Atelectasis versus small area of pneumonia.   a. Repeat chest x-ray 04/06/2015 was essentially unchanged, PCP says \"everything okay.\"   b. Chest x-ray, 10/16/2015 and 03/11/2016 at [facility]: No active disease.   c. [facility], Chest x-ray, 06/09/2016. Impression: Chronic elevation of the right diaphragm. No acute cardiac or pulmonary abnormality is identified.   d. [place] [facility] [place] Impression: Chronic elevation of the right diaphragm. No acute cardiac or pulmonary abnormality is identified.       11.  Hyperglycemia.  On metformin.    PLAN:  1. Apprised of pending labs from " today but also from 12/28/2020 with mild neutrophilic leukocytosis otherwise stable CBC, including no normal MCV without anemia, normal platelets, hyperglycemia, mild hypokalemia ([facility] called in), with otherwise normal CMP, elevated (stable) CEA, and normal CA27-29. Still consider restaging scans if CEA continues to rise.   2. Remind them of the plans as outlined by Dr. Puente on 09/30/2013 (date she saw the patient). Dr. Puente suggested a PET/CT (above) and suggested considering BRCA testing (no mutations, 12/2013). Dr. Puente recommended: Cisplatin 25 mg/m2 every 3 weeks x4 cycles, otherwise observation with treatment at the time of relapse. Dr. Puente agrees that she is at high risk for relapse.   3.  Observe.   4.  Continue ongoing management per primary care physician and other specialists.   5.  Return to the [place] office in 24 weeks with pre-office CMP, CEA, CA27-29, and CBC with differential.    MEDICAL DECISION MAKING: Moderate Complexity   AMOUNT OF DATA: Moderate     I spent 26 total minutes, face-to-face, caring for Gopal today.  Greater than 50% of this time involved counseling and/or coordination of care as documented within this note regarding the patient's illness(es), pros and cons of various treatment options, instructions and/or risk reduction.    cc: MD Evi Mast MD Patricia Williams, MD Peter Locken, MD

## 2021-01-15 ENCOUNTER — OFFICE VISIT (OUTPATIENT)
Dept: ONCOLOGY | Facility: CLINIC | Age: 51
End: 2021-01-15

## 2021-01-15 VITALS
OXYGEN SATURATION: 95 % | DIASTOLIC BLOOD PRESSURE: 84 MMHG | SYSTOLIC BLOOD PRESSURE: 118 MMHG | HEART RATE: 108 BPM | TEMPERATURE: 97.6 F | HEIGHT: 63 IN | BODY MASS INDEX: 43.84 KG/M2 | RESPIRATION RATE: 16 BRPM | WEIGHT: 247.4 LBS

## 2021-01-15 DIAGNOSIS — Z17.1 MALIGNANT NEOPLASM OF RIGHT BREAST IN FEMALE, ESTROGEN RECEPTOR NEGATIVE, UNSPECIFIED SITE OF BREAST (HCC): Primary | ICD-10-CM

## 2021-01-15 DIAGNOSIS — C50.911 MALIGNANT NEOPLASM OF RIGHT BREAST IN FEMALE, ESTROGEN RECEPTOR NEGATIVE, UNSPECIFIED SITE OF BREAST (HCC): Primary | ICD-10-CM

## 2021-01-15 PROCEDURE — 99214 OFFICE O/P EST MOD 30 MIN: CPT | Performed by: INTERNAL MEDICINE

## 2021-07-06 ENCOUNTER — TELEPHONE (OUTPATIENT)
Dept: ONCOLOGY | Facility: CLINIC | Age: 51
End: 2021-07-06

## 2021-07-06 NOTE — TELEPHONE ENCOUNTER
Caller: IAM PEREZ    Relationship to patient: [employer]    Best call back number: [phone]    Type of visit: LAB AND FOLLOW UP    Requested date: LAB - 07/07 FOLLOW UP - 07/14    If rescheduling, when is the original appointment: 07/08 AND 07/15     Additional notes: PLEASE CALL ONCE R/S.

## 2021-07-07 ENCOUNTER — LAB (OUTPATIENT)
Dept: LAB | Facility: HOSPITAL | Age: 51
End: 2021-07-07

## 2021-07-07 DIAGNOSIS — C50.911 MALIGNANT NEOPLASM OF RIGHT BREAST IN FEMALE, ESTROGEN RECEPTOR NEGATIVE, UNSPECIFIED SITE OF BREAST (HCC): ICD-10-CM

## 2021-07-07 DIAGNOSIS — Z17.1 MALIGNANT NEOPLASM OF RIGHT BREAST IN FEMALE, ESTROGEN RECEPTOR NEGATIVE, UNSPECIFIED SITE OF BREAST (HCC): ICD-10-CM

## 2021-07-07 LAB
ALBUMIN SERPL-MCNC: 4.1 G/DL (ref 3.5–5.2)
ALBUMIN/GLOB SERPL: 1.4 G/DL
ALP SERPL-CCNC: 95 U/L (ref 39–117)
ALT SERPL W P-5'-P-CCNC: 11 U/L (ref 1–33)
ANION GAP SERPL CALCULATED.3IONS-SCNC: 8 MMOL/L (ref 5–15)
AST SERPL-CCNC: 7 U/L (ref 1–32)
BASOPHILS # BLD AUTO: 0.06 10*3/MM3 (ref 0–0.2)
BASOPHILS NFR BLD AUTO: 0.6 % (ref 0–1.5)
BILIRUB SERPL-MCNC: 0.3 MG/DL (ref 0–1.2)
BUN SERPL-MCNC: 11 MG/DL (ref 6–20)
BUN/CREAT SERPL: 16.2 (ref 7–25)
CALCIUM SPEC-SCNC: 9.7 MG/DL (ref 8.6–10.5)
CEA SERPL-MCNC: 4.13 NG/ML
CHLORIDE SERPL-SCNC: 105 MMOL/L (ref 98–107)
CO2 SERPL-SCNC: 28 MMOL/L (ref 22–29)
CREAT SERPL-MCNC: 0.68 MG/DL (ref 0.57–1)
DEPRECATED RDW RBC AUTO: 45.4 FL (ref 37–54)
EOSINOPHIL # BLD AUTO: 0.09 10*3/MM3 (ref 0–0.4)
EOSINOPHIL NFR BLD AUTO: 0.9 % (ref 0.3–6.2)
ERYTHROCYTE [DISTWIDTH] IN BLOOD BY AUTOMATED COUNT: 13.2 % (ref 12.3–15.4)
GFR SERPL CREATININE-BSD FRML MDRD: 91 ML/MIN/1.73
GLOBULIN UR ELPH-MCNC: 3 GM/DL
GLUCOSE SERPL-MCNC: 145 MG/DL (ref 65–99)
HCT VFR BLD AUTO: 42.6 % (ref 34–46.6)
HGB BLD-MCNC: 13.8 G/DL (ref 12–15.9)
IMM GRANULOCYTES # BLD AUTO: 0.04 10*3/MM3 (ref 0–0.05)
IMM GRANULOCYTES NFR BLD AUTO: 0.4 % (ref 0–0.5)
LYMPHOCYTES # BLD AUTO: 2.14 10*3/MM3 (ref 0.7–3.1)
LYMPHOCYTES NFR BLD AUTO: 22.3 % (ref 19.6–45.3)
MCH RBC QN AUTO: 30.4 PG (ref 26.6–33)
MCHC RBC AUTO-ENTMCNC: 32.4 G/DL (ref 31.5–35.7)
MCV RBC AUTO: 93.8 FL (ref 79–97)
MONOCYTES # BLD AUTO: 0.66 10*3/MM3 (ref 0.1–0.9)
MONOCYTES NFR BLD AUTO: 6.9 % (ref 5–12)
NEUTROPHILS NFR BLD AUTO: 6.59 10*3/MM3 (ref 1.7–7)
NEUTROPHILS NFR BLD AUTO: 68.9 % (ref 42.7–76)
NRBC BLD AUTO-RTO: 0 /100 WBC (ref 0–0.2)
PLATELET # BLD AUTO: 305 10*3/MM3 (ref 140–450)
PMV BLD AUTO: 10.5 FL (ref 6–12)
POTASSIUM SERPL-SCNC: 3.8 MMOL/L (ref 3.5–5.2)
PROT SERPL-MCNC: 7.1 G/DL (ref 6–8.5)
RBC # BLD AUTO: 4.54 10*6/MM3 (ref 3.77–5.28)
SODIUM SERPL-SCNC: 141 MMOL/L (ref 136–145)
WBC # BLD AUTO: 9.58 10*3/MM3 (ref 3.4–10.8)

## 2021-07-07 PROCEDURE — 86300 IMMUNOASSAY TUMOR CA 15-3: CPT

## 2021-07-07 PROCEDURE — 80053 COMPREHEN METABOLIC PANEL: CPT

## 2021-07-07 PROCEDURE — 36415 COLL VENOUS BLD VENIPUNCTURE: CPT

## 2021-07-07 PROCEDURE — 82378 CARCINOEMBRYONIC ANTIGEN: CPT

## 2021-07-07 PROCEDURE — 85025 COMPLETE CBC W/AUTO DIFF WBC: CPT

## 2021-07-08 ENCOUNTER — APPOINTMENT (OUTPATIENT)
Dept: LAB | Facility: HOSPITAL | Age: 51
End: 2021-07-08

## 2021-07-08 LAB — CANCER AG27-29 SERPL-ACNC: 20 U/ML (ref 0–38.6)

## 2021-07-09 NOTE — PROGRESS NOTES
[facility] ONC [facility] ONCOLOGY  [address] [place] [address] [address]  [facility] [address]  [phone]    Patient Name: Gopal Powers  Encounter Date: 07/14/2021  YOB: 1970  Patient Number: [id number]         REASON FOR VISIT:  Ms. Gopal Powers is a 51-year-old female who returns in follow-up of locally advanced breast cancer. She is seen 97 months since completing cycle 6 of neoadjuvant ACT chemotherapy. She underwent bilateral mastectomies on 07/30/2013 and is now 92.5 months since cycle 4 of adjuvant cisplatin. She completed adjuvant radiation 84 months ago on 07/14/2014. She is here with her sister, Shayla (previously with her brother-in-law, Padilla).     DIAGNOSTIC ABNORMALITIES:   1. Bilateral mammogram and right breast ultrasound, 12/18/2012, [facility]: Nodule just superior and lateral to the right nipple measuring 15 x 16 mm. Another density in the upper outer quadrant measures 12 x 17 mm. Deep to this is a large nodular group of nodules that measure 3.4 x 3.6 cm. Another 3 cm nodule, probable enlarged lymph node, in the right axillary region. Ultrasound showed multiple lesions at the 11 o'clock position. One measures 2.9 x 2.4 x 2 cm. Another 1.7 x 1.4 x 1.8 cm. Another 1.9 x 1.6 x 1.9 cm (the latter predominantly solid). The 4th lesion at the 11 o'clock position measures 1.2 x 0.8 x 1.1 cm. Three nodules in the right axillary region; one measures 2.9 x 2.7 x 2.1 cm, another 1.8 x 1.3 x 1.7 cm, and a third measures 1.2 x 0.7 x 1 cm. The lesion at the 10 o'clock position in the right breast measures 1.4 x 1.2 x 1.6 cm. This appears lobulated, predominantly solid.  2. Chest x-ray, 01/04/2013: Negative chest radiograph.  3. Ultrasound-guided biopsy of the right breast masses, 01/07/2013: Large lobulated mass in the upper outer quadrant of the right breast and the retroareolar area of the right breast. Noted was an enlarged pathologic appearing right  axillary node which was not biopsied. Pathology from the specimens taken during the procedure included the biopsy from the 10 o'clock position (5.7 cm lesion) revealing infiltrating mammary carcinoma. Tumor present in virtually every biopsy. Tumor high grade (20 mitoses per high-powered field). Tumor measured 1 cm in greatest linear dimension. The specimen from the 11 o'clock position in the retroareolar area measured 1.7 cm. Infiltrating mammary carcinoma. Tumor present in virtually every biopsy. Tumor high grade. Tumor measured 1.1 cm in greatest linear dimension.  4. CT scan chest/abdomen/pelvis 01/21/2013, [facility]: Right axillary lymphadenopathy (the more anterior of 2 nodules measures 3.5 x 2.7 cm; the more posterior measures 2.6 x 2.1 cm). Skin thickening in the partially visualized right breast. Left ovary enlarged with a dermoid tumor within it. Previous cholecystectomy.  5. Labs, 02/01/2013: Hemoglobin 15.8, hematocrit 49, MCV 93.1, platelets 350,000, WBC 11.7, ANC 8.6. CMP revealed glucose 164, potassium 3.1, SGOT 6, GFR 97.1. CEA 3.2, CA27-29 of 22.9.  6. 2D echo, 02/05/2013, [facility]: Technically limited and suboptimal study. Left ventricular chamber size, wall thickness, and wall motion normal. No segmental wall motion abnormalities noted. Left ventricular (LV) systolic function normal. Ejection fraction measured at 63%. Left ventricular (LV) diastolic parameters normal. Both atria are normal size. No atrial clots or masses seen. Mitral valve leaflets normal thickness and excursion. No mitral regurgitation. Aortic valve leaflets normal thickness and excursion. Minimal aortic regurgitation. No aortic stenosis. Tricuspid valve anatomically normal. No tricuspid regurgitation. No pericardial effusion.  7. PET scan, 10/15/2013, [facility]: Abnormal uptake in the chest wall bilaterally that appears to correspond to areas of scarring in both breasts (likely related  to the recent surgery), greater on the right side with SUV max 6.9. Multiple areas of paravertebral uptake in the thorax, likely muscular or related to brown fat uptake. Mild uptake in 3 small normal sized lymph nodes in the left axilla. SUV max of the most intense uptake 2. 4.4 cm dermoid tumor in the pelvis in the midline anterior to the uterine fundus, likely arising from the left ovary. Right ovary seen separately. No other areas of pathologic uptake are seen within the neck, chest, abdomen, or pelvis.  8. BRCA1 and 2, 12/02/2012, [facility]: No mutations detected.  9. Surgical pathology, left tube and ovary, 05/29/2014, [facility]: Fallopian tube showed no significant pathologic changes. Ovary showed benign mature teratoma.  10. Peripheral blood comprehensive report, 11/16/2016: Mild absolute leukocytosis. COMMENT. Peripheral blood evaluation: Leukocytosis comprised predominantly of granulocytes with a normal complement of lymphocytes, monocytes, and platelets. Flow cytometric studies: No evidence of significant immunophenotypic aberrancies. No specific findings in the peripheral blood to suggest a cause for the patient's relative neutrophilia. Diagnosis of a myeloid stem cell disorder, if clinically suspected, is best made using bone marrow biopsy. Peripheral blood may not always be fully representative of an underlying bone marrow disorder. Clinical correlation recommended.    PREVIOUS INTERVENTIONS:   1. Neoadjuvant ACT chemotherapy, 02/18/2013 through 06/18/2013. 6 cycles. Prior cycles of chemo delayed due to shingles outbreak.  2. Right modified radical mastectomy (MRM), left prophylactic mastectomy, 07/30/2013. Dr. Mejia. Pathology revealed: 1) Breast and lymph node, right modified radical mastectomy: Poorly differentiated infiltrating ductal cell carcinoma with extensive necrosis. (Margins of resection free of involvement). Fibrocystic changes. Fibroadenoma. Metastatic carcinoma in lymph nodes (2 out  of 27 lymph nodes). 2) Breast, total left mastectomy: Nonproliferative fibrocystic changes (negative for malignancy). ER: Absent/Negative 0.81%. IN: Absent/Negative 0.09 %. HER-2: Not over-expressed. 1+  3. Adjuvant cisplatin chemotherapy, 11/06/2013 through 01/16/2014. 4 cycles.  4. Adjuvant radiation beginning 05/13/2014 through 07/14/2014 (5040 cGy with 1000 cGy boost).        Problem List Items Addressed This Visit        Other    Malignant neoplasm of right female breast (CMS/HCC) - Primary        Oncology/Hematology History   Malignant neoplasm of right female breast (CMS/HCC)   9/2/2016 Initial Diagnosis    Malignant neoplasm of right female breast (CMS/HCC)     12/13/2019 Cancer Staged    Staging form: Breast, AJCC V7  - Clinical stage from 12/13/2019: Stage IIIA (T3(m), N2a, M0) - Signed by Ludwin Whitaker MD on 12/13/2019         PAST MEDICAL HISTORY:  ALLERGIES:  No Known Allergies  CURRENT MEDICATIONS:  Outpatient Encounter Medications as of 7/14/2021   Medication Sig Dispense Refill   • amLODIPine (NORVASC) 5 MG tablet Take 5 mg by mouth Daily.  3   • atorvastatin (LIPITOR) 40 MG tablet Take 40 mg by mouth Daily.  3   • levothyroxine (SYNTHROID, LEVOTHROID) 50 MCG tablet Take 50 mcg by mouth Every Morning Before Breakfast.  3   • metFORMIN ER (GLUCOPHAGE-XR) 500 MG 24 hr tablet        No facility-administered encounter medications on file as of 7/14/2021.     ADULT ILLNESSES:   Breast cancer ( ER Status: Negative; IN Status: Negative; ICD-10:C50.411 ;Malignant neoplasm of upper-outer quadrant of right female breast )   Abnormal chest x ray ( ICD-10:R93.8 ;Abnormal findings on diagnostic imaging of other specified body structures   Chronic kidney disease ( ICD-10:N18.3 ;Chronic kidney disease, stage 3 (moderate)   Disorder of breast (disorder) ( lesion; ICD-10:D24.1 ;Benign neoplasm of right breast )   History of mastectomy ( ICD-10:Z90.11 ;Acquired absence of right breast and nipple   Hyperlipidemia  ( ICD-10:E78.5 ;Hyperlipidemia, unspecified   Hypertension ( ICD-10:I10 ;Essential (primary) hypertension   Hypothyroidism ( ICD-10:E03.9 ;Hypothyroidism, unspecified   Leukocytosis ( ICD-10:D72.829 ;Elevated white blood cell count, unspecified   Mental retardation ( ICD-10:F79 ;Unspecified intellectual disabilities   Obesity ( ICD-10:E66.9 ;Obesity, unspecified   Wound dehiscence ( right mastectomy; ICD-10:T81.31xS ;Disruption of external operation (surgical) wound, not elsewhere classified, sequela )    SURGERIES:   Removal of left port and placement of right subclavian port, 03/05/2013. Dr. Mejia   Left clavicle repair of childhood fracture   Excision of breast lump: Right breast mass excision, 11/10/2011, Dr. Saini   Port removal (right subclavian) sometime 01/23/2017. Dr. Baldwin   Laparoscopic cholecystectomy, 2001, Dr. Flynn   Oophorectomy, left, 06/29/2014, Dr. Dodson   Oophorectomy, right, 05/29/2014. A 4.4 cm dermoid tumor. Pathology showed benign teratoma   Left subclavian Mediport, 02/08/2013, Dr. Mejia      ADULT ILLNESSES:  Patient Active Problem List   Diagnosis Code   • Malignant neoplasm of right female breast (CMS/HCC) C50.911   • Port catheter in place Z95.828     SURGERIES:  No past surgical history on file.  HEALTH MAINTENANCE ITEMS:  Health Maintenance Due   Topic Date Due   • MAMMOGRAM  Never done   • URINE MICROALBUMIN  Never done   • COLORECTAL CANCER SCREENING  Never done   • Pneumococcal Vaccine 0-64 (1 of 1 - PPSV23) Never done   • COVID-19 Vaccine (1) Never done   • TDAP/TD VACCINES (1 - Tdap) Never done   • ZOSTER VACCINE (1 of 2) Never done   • HEPATITIS C SCREENING  Never done   • DIABETIC FOOT EXAM  Never done   • PAP SMEAR  Never done   • DIABETIC EYE EXAM  Never done   • HEMOGLOBIN A1C  12/16/2020   • ANNUAL WELLNESS VISIT  06/03/2021       <no information>  Last Completed Colonoscopy     This patient has no relevant Health Maintenance data.          There is no immunization  "history on file for this patient.  Last Completed Mammogram     This patient has no relevant Health Maintenance data.            FAMILY HISTORY:  History reviewed. No pertinent family history.  SOCIAL HISTORY:  Social History     Socioeconomic History   • Marital status: Single     Spouse name: Not on file   • Number of children: Not on file   • Years of education: Not on file   • Highest education level: Not on file   Tobacco Use   • Smoking status: Never Smoker   • Smokeless tobacco: Never Used       REVIEW OF SYSTEMS:  Constitutional:   The patient's appetite is good, sister says, \"I still keep it on a short leash.\" Her energy is fairly good, sister says, \"She's doing pretty good.\" Sister says she is less sedentary. She has intentionally lost 12 pounds (in addition to 20 pounds at her 2 prior visits) since her last visit. Sister says she has been supervising her meals, \"sticking with the healthier choices and smaller portions.\" She has no fevers, chills, or drenching night sweats. Her sleep habits seem appropriate.  Ear/Nose/Mouth/Throat:   She reports no ear pains, but has allergy sinus symptoms, without sore throat, nosebleeds, or sore tongue. She has no headaches. She denies any hoarseness, change in voice quality.  Ocular:   She reports no eye pain, significant change in visual acuity, double vision, or blurry vision.  Respiratory:   She has baseline exertional dyspnea but has no shortness of breath with her routine activities. She reports no cough, no significant shortness of breathing at rest, phlegm production, or unexplained chest wall pain.  Cardiovascular:   She reports no exertional chest pain, chest pressure, or chest heaviness. She has no claudication. She reports no palpitations or symptomatic orthostasis.  Gastrointestinal:   She reports no dysphagia, nausea, vomiting, postprandial abdominal pain, bloating, cramping, change in bowel habits, or discoloration of the stool. She reports no rectal " "bleeding. She has not had any constipation but still has bouts of soft stools (chronic) but no overt diarrhea.  Genitourinary:   She reports no urinary burning, frequency, dribbling, or discoloration. She reports no need to urinate frequently through the night. She reports no difficulty controlling her bladder.  GYN:   She has no unexplained vaginal bleeding and no significant vaginal discharge.  Breasts:   She denies any more \"soreness\" from the surgical sites.  Musculoskeletal:   She reports no unexplained arthralgias, myalgias, or nighttime leg cramping.  Extremities:   She reports trouble with fluid retention in the arms but no significant leg swelling.  Endocrine:   She reports no problems with excess thirst, excessive urination, vasomotor instability, or unexplained fatigue.  Heme/Lymphatic:   She reports no unexplained bleeding, bruising, petechial rash, or swollen glands.  Skin:   She has intermittent itching or rash around the lower abdominal incision site. No recurrent shingles on her back.  Neuro:   She reports no loss of consciousness, seizures, fainting spells, or dizziness. She has no weakness of her face, arms, or legs. She has no difficulty with speech. She has no tremors.  Psych:   She seems generally satisfied with life. She denies depression nor anxiety. She reports no mood swings.      VITAL SIGNS: /88   Pulse 84   Temp 97.2 °F (36.2 °C)   Resp 16   Ht 160 cm (63\")   Wt 107 kg (235 lb 3.2 oz)   SpO2 97%   Breastfeeding No   BMI 41.66 kg/m² Body surface area is 2.07 meters squared.  Pain Score    07/14/21 1323   PainSc: 0-No pain         PHYSICAL EXAMINATION:   General:   She is a pleasant, obese but visibly slimmer and modestly-kept female who is comfortable at rest. She arrived in the exam room ambulatory. She appears to be her stated age. Her skin color is normal. ECOG PS = 0  Head/Neck:   The patient is anicteric and atraumatic. She is wearing a surgical mask.  The trachea is " midline. The neck is supple without evidence of jugular venous distention or cervical adenopathy.  Eyes:   The pupils are equal, round, and reactive to light. The extraocular movements are full. There is no scleral jaundice or erythema.  Chest:   The respiratory efforts are normal and unhindered. The chest is clear to auscultation and percussion. There are no wheezes, rhonchi, rales, or asymmetry of breath sounds.  Breasts:   Inspection of the mastectomy sites is again notable for healed wounds with underlying scar thickness and overlying skin folds (left over post-op). There is no axillary adenopathy. The previous Mediport site in the left upper chest is healed. The right-sided Mediport has been removed (sometime 01/23/2017 per Dr. Baldwin) after it flipped over. The site is healed with moderate scar formation.  Cardiovascular:   The patient has a regular cardiac rate and rhythm without murmurs, rubs, or gallops. The peripheral pulses are equal and full.  Abdomen:   The belly is soft and obese. Her habitus precludes an adequate exam. There is no rebound or guarding. There is no obvious organomegaly, mass-effect, or tenderness. Bowel sounds are active and of normal character. The lower abdominal incision is healed.   Extremities:   There is (subtle) arm lymphedema, right greater than left with no evidence of cyanosis or clubbing with 1+ ankle edema.  Rheumatologic:   There is no overt evidence of rheumatoid deformities of the hands. There is no sausaging of the fingers. There is no sign of active synovitis. The gait is slow but independent.  Cutaneous:   There are no overt rashes, disseminated lesions, purpura, or petechiae.   Lymphatics:   There is no evidence of adenopathy in the cervical, supraclavicular, or axillary areas.  Neurologic:   The patient is alert, oriented, cooperative, and pleasant. She is appropriately conversant. She ambulated into the exam room without assistance and transferred from chair to  exam table unassisted today. There is no overt dysfunction of the motor, sensory, or cerebellar systems.  Psych:   Mood and affect are appropriate for circumstance. Eye contact is appropriate.        LABS    Lab Results - Last 18 Months   Lab Units 07/07/21  1318 12/28/20  1237 06/25/20  1025   HEMOGLOBIN g/dL 13.8 15.0 14.8   HEMATOCRIT % 42.6 44.3 46.4   MCV fL 93.8 92.5 92.2   WBC 10*3/mm3 9.58 11.78* 13.01*   RDW % 13.2 13.5 13.0   MPV fL 10.5 10.5 10.4   PLATELETS 10*3/mm3 305 313 361   IMM GRAN % % 0.4 0.5 0.3   NEUTROS ABS 10*3/mm3 6.59 9.11* 9.79*   LYMPHS ABS 10*3/mm3 2.14 1.72 2.12   MONOS ABS 10*3/mm3 0.66 0.77 0.86   EOS ABS 10*3/mm3 0.09 0.06 0.12   BASOS ABS 10*3/mm3 0.06 0.06 0.08   IMMATURE GRANS (ABS) 10*3/mm3 0.04 0.06* 0.04   NRBC /100 WBC 0.0 0.0 0.0       Lab Results - Last 18 Months   Lab Units 07/07/21  1318 12/28/20  1237 06/25/20  1025   GLUCOSE mg/dL 145* 206* 142*   SODIUM mmol/L 141 138 141   POTASSIUM mmol/L 3.8 3.4* 4.5   CO2 mmol/L 28.0 27.0 27.0   CHLORIDE mmol/L 105 102 101   ANION GAP mmol/L 8.0 9.0 13.0   CREATININE mg/dL 0.68 0.63 0.76   BUN mg/dL 11 11 22*   BUN / CREAT RATIO  16.2 17.5 28.9*   CALCIUM mg/dL 9.7 9.1 10.1   EGFR IF NONAFRICN AM mL/min/1.73 91 100 81   ALK PHOS U/L 95 103 102   TOTAL PROTEIN g/dL 7.1 7.4 7.5   ALT (SGPT) U/L 11 11 13   AST (SGOT) U/L 7 7 6   BILIRUBIN mg/dL 0.3 0.6 0.3   ALBUMIN g/dL 4.10 4.20 4.40   GLOBULIN gm/dL 3.0 3.2 3.1       Lab Results - Last 18 Months   Lab Units 07/07/21  1318 12/28/20  1237 06/25/20  1025   CEA ng/mL 4.13 3.97 3.66       ASSESSMENT:   1. Right breast invasive ductal carcinoma:   Stage: At least IIIA (cT3, cN2a, M0, G3), ER (-)/CO (-). HER-2/[place] (-). High grade (20 mitoses per high-powered field).   Tumor [place]: Multiple solid nodules in the right breast (multiple lesions at 11 o'clock measuring 2.9 x 2.4 x 2.2 cm, 1.7 x 1.4 x 1.8 cm, 1.9 x 1.6 x 1.9 cm, 1.2 x 0.8 x 1.1 cm); a lesion at the 10 o'clock in the right  "breast measuring 1.4 x 1.2 x 1.6 cm; 3 nodules in the right axillary region measuring 2.9 x 2.7 x 2.1 cm, another 1.8 x 1.3 x 1.7 cm, and a third measuring 1.2 x 0.7 x 1 cm.   Complications of Tumor: Pain and fullness of the right breast and right axilla.   BRCA status (1 and 2): No mutations, 12/2013.   Tumor Status: Underwent neoadjuvant chemotherapy followed by bilateral mastectomies, 07/30/2013. Pathology revealed: 1. Breast and lymph node, right modified radical mastectomy: Poorly differentiated infiltrating ductal cell carcinoma with extensive necrosis. (Margins of resection free of involvement). Fibrocystic changes. Fibroadenoma. Metastatic carcinoma in lymph nodes (2 out of 27 lymph nodes). 2. Breast, total left mastectomy: Nonproliferative fibrocystic changes (negative for malignancy). Adjuvant cisplatin and radiation completed. LENARD.  2. Mental retardation.   3. Mild neutrophilic leukocytosis, NOS.   --Flow cytometry, 11/16/2016 (above) unrevealing.  No localizing signs/symptoms.    K--Normal counts, 07/07/2021  4. Borderline erythrocytosis.  Normal counts, 07/07/2021.  5. Lymphedema both arms. Asymptomatic.   6. Hypothyroidism.   7. Mildly abnormal CEA.   --Stable, 4.13, 07/07/2021 (prior range: 2.2 - 5.2).   8. Obesity.   9. Chronic kidney disease, GFR 57.35 mL/min, 01/27/2014, and 02/12/2014.   --Stable, GFR 91 mL/min, 07/07/2021 (prior range: 51 - 100 mL/min).   10. Abnormal chest x-ray, 02/06/2015. Atelectasis versus small area of pneumonia.   a. Repeat chest x-ray 04/06/2015 was essentially unchanged, PCP says \"everything okay.\"   b. Chest x-ray, 10/16/2015 and 03/11/2016 at [facility]: No active disease.   c. [facility], Chest x-ray, 06/09/2016. Impression: Chronic elevation of the right diaphragm. No acute cardiac or pulmonary abnormality is identified.   d. Chest x-[place] at [facility] [place]28/2017. Impression: Chronic elevation of the right diaphragm. No " acute cardiac or pulmonary abnormality is identified.       11.  Hyperglycemia.  On metformin.    PLAN:  1. Apprised of pending labs from today but also from 07/07/2021 with normal CBC, including normal MCV without anemia, normal platelets, borderline hyperglycemia with otherwise normal CMP, elevated (stable) CEA, and normal CA27-29. Still consider restaging scans if CEA continues to rise.   2. Remind them of the plans as outlined by Dr. Puente on 09/30/2013 (date she saw the patient). Dr. Puente suggested a PET/CT (above) and suggested considering BRCA testing (no mutations, 12/2013). Dr. Puente recommended: Cisplatin 25 mg/m2 every 3 weeks x4 cycles, otherwise observation with treatment at the time of relapse. Dr. Puente agrees that she is at high risk for relapse.   3.  Observe.   4.  Continue ongoing management per primary care physician and other specialists.   5.  Return to the [place] office in 24 weeks with pre-office CMP, CEA, CA27-29, and CBC with differential.    MEDICAL DECISION MAKING: Low Complexity   AMOUNT OF DATA: Limited    I spent 20 total minutes, face-to-face, caring for Gopal today.  Greater than 50% of this time involved counseling and/or coordination of care as documented within this note regarding the patient's illness(es), pros and cons of various treatment options, instructions and/or risk reduction.    cc: MD Evi Mast MD Patricia Williams, MD Peter Locken, MD

## 2021-07-14 ENCOUNTER — OFFICE VISIT (OUTPATIENT)
Dept: ONCOLOGY | Facility: CLINIC | Age: 51
End: 2021-07-14

## 2021-07-14 VITALS
OXYGEN SATURATION: 97 % | HEART RATE: 84 BPM | WEIGHT: 235.2 LBS | SYSTOLIC BLOOD PRESSURE: 128 MMHG | TEMPERATURE: 97.2 F | BODY MASS INDEX: 41.67 KG/M2 | HEIGHT: 63 IN | RESPIRATION RATE: 16 BRPM | DIASTOLIC BLOOD PRESSURE: 88 MMHG

## 2021-07-14 DIAGNOSIS — Z17.1 MALIGNANT NEOPLASM OF RIGHT BREAST IN FEMALE, ESTROGEN RECEPTOR NEGATIVE, UNSPECIFIED SITE OF BREAST (HCC): Primary | ICD-10-CM

## 2021-07-14 DIAGNOSIS — C50.911 MALIGNANT NEOPLASM OF RIGHT BREAST IN FEMALE, ESTROGEN RECEPTOR NEGATIVE, UNSPECIFIED SITE OF BREAST (HCC): Primary | ICD-10-CM

## 2021-07-14 PROCEDURE — 99213 OFFICE O/P EST LOW 20 MIN: CPT | Performed by: INTERNAL MEDICINE

## 2022-01-06 ENCOUNTER — APPOINTMENT (OUTPATIENT)
Dept: LAB | Facility: HOSPITAL | Age: 52
End: 2022-01-06

## 2022-03-03 ENCOUNTER — LAB (OUTPATIENT)
Dept: LAB | Facility: HOSPITAL | Age: 52
End: 2022-03-03

## 2022-03-03 DIAGNOSIS — Z17.1 MALIGNANT NEOPLASM OF RIGHT BREAST IN FEMALE, ESTROGEN RECEPTOR NEGATIVE, UNSPECIFIED SITE OF BREAST: ICD-10-CM

## 2022-03-03 DIAGNOSIS — C50.911 MALIGNANT NEOPLASM OF RIGHT BREAST IN FEMALE, ESTROGEN RECEPTOR NEGATIVE, UNSPECIFIED SITE OF BREAST: ICD-10-CM

## 2022-03-03 LAB
ALBUMIN SERPL-MCNC: 4.4 G/DL (ref 3.5–5.2)
ALBUMIN/GLOB SERPL: 1.3 G/DL
ALP SERPL-CCNC: 94 U/L (ref 39–117)
ALT SERPL W P-5'-P-CCNC: 13 U/L (ref 1–33)
ANION GAP SERPL CALCULATED.3IONS-SCNC: 10 MMOL/L (ref 5–15)
AST SERPL-CCNC: 5 U/L (ref 1–32)
BASOPHILS # BLD AUTO: 0.07 10*3/MM3 (ref 0–0.2)
BASOPHILS NFR BLD AUTO: 0.7 % (ref 0–1.5)
BILIRUB SERPL-MCNC: 0.3 MG/DL (ref 0–1.2)
BUN SERPL-MCNC: 15 MG/DL (ref 6–20)
BUN/CREAT SERPL: 21.4 (ref 7–25)
CALCIUM SPEC-SCNC: 9.8 MG/DL (ref 8.6–10.5)
CEA SERPL-MCNC: 3.24 NG/ML
CHLORIDE SERPL-SCNC: 103 MMOL/L (ref 98–107)
CO2 SERPL-SCNC: 27 MMOL/L (ref 22–29)
CREAT SERPL-MCNC: 0.7 MG/DL (ref 0.57–1)
DEPRECATED RDW RBC AUTO: 46.9 FL (ref 37–54)
EGFRCR SERPLBLD CKD-EPI 2021: 104.2 ML/MIN/1.73
EOSINOPHIL # BLD AUTO: 0.11 10*3/MM3 (ref 0–0.4)
EOSINOPHIL NFR BLD AUTO: 1.1 % (ref 0.3–6.2)
ERYTHROCYTE [DISTWIDTH] IN BLOOD BY AUTOMATED COUNT: 13.3 % (ref 12.3–15.4)
GLOBULIN UR ELPH-MCNC: 3.3 GM/DL
GLUCOSE SERPL-MCNC: 169 MG/DL (ref 65–99)
HCT VFR BLD AUTO: 46 % (ref 34–46.6)
HGB BLD-MCNC: 14.7 G/DL (ref 12–15.9)
IMM GRANULOCYTES # BLD AUTO: 0.05 10*3/MM3 (ref 0–0.05)
IMM GRANULOCYTES NFR BLD AUTO: 0.5 % (ref 0–0.5)
LYMPHOCYTES # BLD AUTO: 2.36 10*3/MM3 (ref 0.7–3.1)
LYMPHOCYTES NFR BLD AUTO: 23 % (ref 19.6–45.3)
MCH RBC QN AUTO: 30.2 PG (ref 26.6–33)
MCHC RBC AUTO-ENTMCNC: 32 G/DL (ref 31.5–35.7)
MCV RBC AUTO: 94.5 FL (ref 79–97)
MONOCYTES # BLD AUTO: 0.71 10*3/MM3 (ref 0.1–0.9)
MONOCYTES NFR BLD AUTO: 6.9 % (ref 5–12)
NEUTROPHILS NFR BLD AUTO: 6.95 10*3/MM3 (ref 1.7–7)
NEUTROPHILS NFR BLD AUTO: 67.8 % (ref 42.7–76)
NRBC BLD AUTO-RTO: 0 /100 WBC (ref 0–0.2)
PLATELET # BLD AUTO: 336 10*3/MM3 (ref 140–450)
PMV BLD AUTO: 10.2 FL (ref 6–12)
POTASSIUM SERPL-SCNC: 4.4 MMOL/L (ref 3.5–5.2)
PROT SERPL-MCNC: 7.7 G/DL (ref 6–8.5)
RBC # BLD AUTO: 4.87 10*6/MM3 (ref 3.77–5.28)
SODIUM SERPL-SCNC: 140 MMOL/L (ref 136–145)
WBC NRBC COR # BLD: 10.25 10*3/MM3 (ref 3.4–10.8)

## 2022-03-03 PROCEDURE — 85025 COMPLETE CBC W/AUTO DIFF WBC: CPT

## 2022-03-03 PROCEDURE — 82378 CARCINOEMBRYONIC ANTIGEN: CPT

## 2022-03-03 PROCEDURE — 36415 COLL VENOUS BLD VENIPUNCTURE: CPT

## 2022-03-03 PROCEDURE — 86300 IMMUNOASSAY TUMOR CA 15-3: CPT

## 2022-03-03 PROCEDURE — 80053 COMPREHEN METABOLIC PANEL: CPT

## 2022-03-04 LAB — CANCER AG27-29 SERPL-ACNC: 22.7 U/ML (ref 0–38.6)

## 2022-03-06 NOTE — PROGRESS NOTES
[facility] ONC [facility] ONCOLOGY  [address] [place] [address] [address]  [facility] [id number]  [phone]    Patient Name: Gopal Powers  Encounter Date: 03/10/2022  YOB: 1970  Patient Number: [id number]       REASON FOR VISIT:  Ms. Gopal Powers is a 51-year-old female who returns in follow-up of locally advanced breast cancer. She is seen 105 months since completing cycle 6 of neoadjuvant ACT chemotherapy. She underwent bilateral mastectomies on 07/30/2013 and is now ~ 100 months since cycle 4 of adjuvant cisplatin. She completed adjuvant radiation 92 months ago on 07/14/2014. She is here with her sister, Shayla (previously with her brother-in-law, Padilla).     I have reviewed the HPI and verified with the patient the accuracy of it. No changes to interval history since the information was documented. Ludwin Whitaker MD 03/10/22     DIAGNOSTIC ABNORMALITIES:   1. Bilateral mammogram and right breast ultrasound, 12/18/2012, [facility]: Nodule just superior and lateral to the right nipple measuring 15 x 16 mm. Another density in the upper outer quadrant measures 12 x 17 mm. Deep to this is a large nodular group of nodules that measure 3.4 x 3.6 cm. Another 3 cm nodule, probable enlarged lymph node, in the right axillary region. Ultrasound showed multiple lesions at the 11 o'clock position. One measures 2.9 x 2.4 x 2 cm. Another 1.7 x 1.4 x 1.8 cm. Another 1.9 x 1.6 x 1.9 cm (the latter predominantly solid). The 4th lesion at the 11 o'clock position measures 1.2 x 0.8 x 1.1 cm. Three nodules in the right axillary region; one measures 2.9 x 2.7 x 2.1 cm, another 1.8 x 1.3 x 1.7 cm, and a third measures 1.2 x 0.7 x 1 cm. The lesion at the 10 o'clock position in the right breast measures 1.4 x 1.2 x 1.6 cm. This appears lobulated, predominantly solid.  2. Chest x-ray, 01/04/2013: Negative chest radiograph.  3. Ultrasound-guided biopsy of the right breast masses,  01/07/2013: Large lobulated mass in the upper outer quadrant of the right breast and the retroareolar area of the right breast. Noted was an enlarged pathologic appearing right axillary node which was not biopsied. Pathology from the specimens taken during the procedure included the biopsy from the 10 o'clock position (5.7 cm lesion) revealing infiltrating mammary carcinoma. Tumor present in virtually every biopsy. Tumor high grade (20 mitoses per high-powered field). Tumor measured 1 cm in greatest linear dimension. The specimen from the 11 o'clock position in the retroareolar area measured 1.7 cm. Infiltrating mammary carcinoma. Tumor present in virtually every biopsy. Tumor high grade. Tumor measured 1.1 cm in greatest linear dimension.  4. CT scan chest/abdomen/pelvis 01/21/2013, [facility]: Right axillary lymphadenopathy (the more anterior of 2 nodules measures 3.5 x 2.7 cm; the more posterior measures 2.6 x 2.1 cm). Skin thickening in the partially visualized right breast. Left ovary enlarged with a dermoid tumor within it. Previous cholecystectomy.  5. Labs, 02/01/2013: Hemoglobin 15.8, hematocrit 49, MCV 93.1, platelets 350,000, WBC 11.7, ANC 8.6. CMP revealed glucose 164, potassium 3.1, SGOT 6, GFR 97.1. CEA 3.2, CA27-29 of 22.9.  6. 2D echo, 02/05/2013, [facility]: Technically limited and suboptimal study. Left ventricular chamber size, wall thickness, and wall motion normal. No segmental wall motion abnormalities noted. Left ventricular (LV) systolic function normal. Ejection fraction measured at 63%. Left ventricular (LV) diastolic parameters normal. Both atria are normal size. No atrial clots or masses seen. Mitral valve leaflets normal thickness and excursion. No mitral regurgitation. Aortic valve leaflets normal thickness and excursion. Minimal aortic regurgitation. No aortic stenosis. Tricuspid valve anatomically normal. No tricuspid regurgitation. No pericardial  effusion.  7. PET scan, 10/15/2013, [facility]: Abnormal uptake in the chest wall bilaterally that appears to correspond to areas of scarring in both breasts (likely related to the recent surgery), greater on the right side with SUV max 6.9. Multiple areas of paravertebral uptake in the thorax, likely muscular or related to brown fat uptake. Mild uptake in 3 small normal sized lymph nodes in the left axilla. SUV max of the most intense uptake 2. 4.4 cm dermoid tumor in the pelvis in the midline anterior to the uterine fundus, likely arising from the left ovary. Right ovary seen separately. No other areas of pathologic uptake are seen within the neck, chest, abdomen, or pelvis.  8. BRCA1 and 2, 12/02/2012, [facility]: No mutations detected.  9. Surgical pathology, left tube and ovary, 05/29/2014, [facility]: Fallopian tube showed no significant pathologic changes. Ovary showed benign mature teratoma.  10. Peripheral blood comprehensive report, 11/16/2016: Mild absolute leukocytosis. COMMENT. Peripheral blood evaluation: Leukocytosis comprised predominantly of granulocytes with a normal complement of lymphocytes, monocytes, and platelets. Flow cytometric studies: No evidence of significant immunophenotypic aberrancies. No specific findings in the peripheral blood to suggest a cause for the patient's relative neutrophilia. Diagnosis of a myeloid stem cell disorder, if clinically suspected, is best made using bone marrow biopsy. Peripheral blood may not always be fully representative of an underlying bone marrow disorder. Clinical correlation recommended.    PREVIOUS INTERVENTIONS:   1. Neoadjuvant ACT chemotherapy, 02/18/2013 through 06/18/2013. 6 cycles. Prior cycles of chemo delayed due to shingles outbreak.  2. Right modified radical mastectomy (MRM), left prophylactic mastectomy, 07/30/2013. Dr. Mejia. Pathology revealed: 1) Breast and lymph node, right modified radical mastectomy: Poorly  differentiated infiltrating ductal cell carcinoma with extensive necrosis. (Margins of resection free of involvement). Fibrocystic changes. Fibroadenoma. Metastatic carcinoma in lymph nodes (2 out of 27 lymph nodes). 2) Breast, total left mastectomy: Nonproliferative fibrocystic changes (negative for malignancy). [place]: Absent/Negative 0.81%. [place]: Absent/Negative 0.09 %. HER-2: Not over-expressed. 1+  3. Adjuvant cisplatin chemotherapy, 11/06/2013 through 01/16/2014. 4 cycles.  4. Adjuvant radiation beginning 05/13/2014 through 07/14/2014 (5040 cGy with 1000 cGy boost).        Problem List Items Addressed This Visit        Other    Malignant neoplasm of right female breast (HCC) - Primary    Relevant Orders    Comprehensive Metabolic Panel    CEA    Cancer Antigen 27.29    CBC & Differential        Oncology/Hematology History   Malignant neoplasm of right female breast (HCC)   9/2/2016 Initial Diagnosis    Malignant neoplasm of right female breast (CMS/HCC)     12/13/2019 Cancer Staged    Staging form: Breast, AJCC V7  - Clinical stage from 12/13/2019: Stage IIIA (T3(m), N2a, M0) - Signed by Ludwin Whitaker MD on 12/13/2019         PAST MEDICAL HISTORY:  ALLERGIES:  No Known Allergies  CURRENT MEDICATIONS:  Outpatient Encounter Medications as of 3/10/2022   Medication Sig Dispense Refill   • amLODIPine (NORVASC) 5 MG tablet Take 5 mg by mouth Daily.  3   • atorvastatin (LIPITOR) 40 MG tablet Take 40 mg by mouth Daily.  3   • levothyroxine (SYNTHROID, LEVOTHROID) 50 MCG tablet Take 50 mcg by mouth Every Morning Before Breakfast.  3   • metFORMIN ER (GLUCOPHAGE-XR) 500 MG 24 hr tablet        No facility-administered encounter medications on file as of 3/10/2022.     ADULT ILLNESSES:   Breast cancer ( [place] Status: Negative; [place] Status: Negative; ICD-10:C50.411 ;Malignant neoplasm of upper-outer quadrant of right female breast )   Abnormal chest x ray ( ICD-10:R93.8 ;Abnormal findings on diagnostic imaging of other  specified body structures   Chronic kidney disease ( ICD-10:N18.3 ;Chronic kidney disease, stage 3 (moderate)   Disorder of breast (disorder) ( lesion; ICD-10:D24.1 ;Benign neoplasm of right breast )   History of mastectomy ( ICD-10:Z90.11 ;Acquired absence of right breast and nipple   Hyperlipidemia ( ICD-10:E78.5 ;Hyperlipidemia, unspecified   Hypertension ( ICD-10:I10 ;Essential (primary) hypertension   Hypothyroidism ( ICD-10:E03.9 ;Hypothyroidism, unspecified   Leukocytosis ( ICD-10:D72.829 ;Elevated white blood cell count, unspecified   Mental retardation ( ICD-10:F79 ;Unspecified intellectual disabilities   Obesity ( ICD-10:E66.9 ;Obesity, unspecified   Wound dehiscence ( right mastectomy; ICD-10:T81.31xS ;Disruption of external operation (surgical) wound, not elsewhere classified, sequela )    SURGERIES:   Removal of left port and placement of right subclavian port, 03/05/2013. Dr. Mejia   Left clavicle repair of childhood fracture   Excision of breast lump: Right breast mass excision, 11/10/2011, Dr. Saini   Port removal (right subclavian) sometime 01/23/2017. Dr. Baldwin   Laparoscopic cholecystectomy, 2001, Dr. Flynn   Oophorectomy, left, 06/29/2014, Dr. Dodson   Oophorectomy, right, 05/29/2014. A 4.4 cm dermoid tumor. Pathology showed benign teratoma   Left subclavian Mediport, 02/08/2013, Dr. Mejia      ADULT ILLNESSES:  Patient Active Problem List   Diagnosis Code   • Malignant neoplasm of right female breast (HCC) C50.911   • Port catheter in place Z95.828   • Hypothyroidism E03.9   • Mixed hyperlipidemia E78.2   • Vitamin D deficiency E55.9     SURGERIES:  No past surgical history on file.  HEALTH MAINTENANCE ITEMS:  Health Maintenance Due   Topic Date Due   • MAMMOGRAM  Never done   • URINE MICROALBUMIN  Never done   • COLORECTAL CANCER SCREENING  Never done   • COVID-19 Vaccine (1) Never done   • Pneumococcal Vaccine 0-64 (1 of 2 - PPSV23) Never done   • TDAP/TD VACCINES (1 - Tdap) Never  "done   • ZOSTER VACCINE (1 of 2) Never done   • HEPATITIS C SCREENING  Never done   • DIABETIC FOOT EXAM  Never done   • PAP SMEAR  Never done   • DIABETIC EYE EXAM  Never done   • HEMOGLOBIN A1C  12/16/2020   • ANNUAL WELLNESS VISIT  06/03/2021   • INFLUENZA VACCINE  08/01/2021   • LIPID PANEL  03/10/2022       <no information>  Last Completed Colonoscopy     This patient has no relevant Health Maintenance data.          There is no immunization history on file for this patient.  Last Completed Mammogram     This patient has no relevant Health Maintenance data.            FAMILY HISTORY:  History reviewed. No pertinent family history.  SOCIAL HISTORY:  Social History     Socioeconomic History   • Marital status: Single   Tobacco Use   • Smoking status: Never Smoker   • Smokeless tobacco: Never Used       REVIEW OF SYSTEMS:  Constitutional:   The patient's appetite is good, sister says, \"I still try to keep it on a short leash but she eats at night.\" Her energy is fairly good, sister says, \"She's doing pretty good otherwise.\" Sister says she is more active. She has regained 14 pounds (had intentionally lost 52 pounds at her 3 prior visits) since her last visit. Sister says she has been supervising her meals, \"but she sneaks in snacks when I'm asleep.\" She has no fevers, chills, or drenching night sweats. Her sleep habits seem appropriate.  Ear/Nose/Mouth/Throat:   She reports no ear pains, but has allergy sinus symptoms, without sore throat, nosebleeds, or sore tongue. She has no headaches. She denies any hoarseness, change in voice quality.  Ocular:   She reports no eye pain, significant change in visual acuity, double vision, or blurry vision.  Respiratory:   She has baseline exertional dyspnea but has no shortness of breath with her routine activities. She reports no cough, no significant shortness of breathing at rest, phlegm production, or unexplained chest wall pain.  Cardiovascular:   She reports no " "exertional chest pain, chest pressure, or chest heaviness. She has no claudication. She reports no palpitations or symptomatic orthostasis.  Gastrointestinal:   She reports no dysphagia, nausea, vomiting, postprandial abdominal pain, bloating, cramping, change in bowel habits, or discoloration of the stool. She reports no rectal bleeding. She has not had any constipation but still has bouts of soft stools (chronic) but no overt diarrhea.  Genitourinary:   She reports no urinary burning, frequency, dribbling, or discoloration. She reports no need to urinate frequently through the night. She reports no difficulty controlling her bladder.  GYN:   She has no unexplained vaginal bleeding and no significant vaginal discharge.  Breasts:   She denies any more \"soreness\" from the surgical sites.  Musculoskeletal:   She reports no unexplained arthralgias, myalgias, or nighttime leg cramping.  Extremities:   She reports trouble with fluid retention in the arms but no significant leg swelling.  Endocrine:   She reports no problems with excess thirst, excessive urination, vasomotor instability, or unexplained fatigue.  Heme/Lymphatic:   She reports no unexplained bleeding, bruising, petechial rash, or swollen glands.  Skin:   She has intermittent itching or rash around the lower abdominal incision site. No recurrent shingles on her back.  Neuro:   She reports no loss of consciousness, seizures, fainting spells, or dizziness. She has no weakness of her face, arms, or legs. She has no difficulty with speech. She has no tremors.  Psych:   She seems generally satisfied with life. She denies depression nor anxiety. She reports no mood swings.        VITAL SIGNS: /76   Pulse 91   Temp 97.7 °F (36.5 °C)   Resp 16   Ht 160 cm (63\")   Wt 113 kg (249 lb 6.4 oz)   SpO2 98%   Breastfeeding No   BMI 44.18 kg/m² Body surface area is 2.12 meters squared.  Pain Score    03/10/22 1318   PainSc: 0-No pain         PHYSICAL " EXAMINATION:   General:   She is a pleasant, obese and modestly-kept female who is comfortable at rest. She arrived in the exam room ambulatory. She appears to be her stated age. Her skin color is normal. ECOG PS = 0  Head/Neck:   The patient is anicteric and atraumatic. She is wearing a surgical mask.  The trachea is midline. The neck is supple without evidence of jugular venous distention or cervical adenopathy.  Eyes:   The pupils are equal, round, and reactive to light. The extraocular movements are full. There is no scleral jaundice or erythema.  Chest:   The respiratory efforts are normal and unhindered. The chest is clear to auscultation and percussion. There are no wheezes, rhonchi, rales, or asymmetry of breath sounds.  Breasts:   Inspection of the mastectomy sites is again notable for healed wounds with underlying scar thickness and overlying skin folds (left over post-op). There is no axillary adenopathy. The previous Mediport site in the left upper chest is healed. The right-sided Mediport has been removed (sometime 01/23/2017 per Dr. Baldwin) after it flipped over. The site is healed with moderate scar formation.  Cardiovascular:   The patient has a regular cardiac rate and rhythm without murmurs, rubs, or gallops. The peripheral pulses are equal and full.  Abdomen:   The belly is soft and obese. Her habitus precludes an adequate exam. There is no rebound or guarding. There is no obvious organomegaly, mass-effect, or tenderness. Bowel sounds are active and of normal character. The lower abdominal incision is healed.   Extremities:   There is (subtle) arm lymphedema, right greater than left with no evidence of cyanosis or clubbing with 1+ ankle edema.  Rheumatologic:   There is no overt evidence of rheumatoid deformities of the hands. There is no sausaging of the fingers. There is no sign of active synovitis. The gait is slow but independent.  Cutaneous:   There are no overt rashes, disseminated lesions,  purpura, or petechiae.   Lymphatics:   There is no evidence of adenopathy in the cervical, supraclavicular, or axillary areas.  Neurologic:   The patient is alert, oriented, cooperative, and pleasant. She is appropriately conversant. She ambulated into the exam room without assistance and transferred from chair to exam table unassisted today. There is no overt dysfunction of the motor, sensory, or cerebellar systems.  Psych:   Mood and affect are appropriate for circumstance. Eye contact is appropriate.        LABS    Lab Results - Last 18 Months   Lab Units 03/03/22  1301 07/07/21  1318 12/28/20  1237   HEMOGLOBIN g/dL 14.7 13.8 15.0   HEMATOCRIT % 46.0 42.6 44.3   MCV fL 94.5 93.8 92.5   WBC 10*3/mm3 10.25 9.58 11.78*   RDW % 13.3 13.2 13.5   MPV fL 10.2 10.5 10.5   PLATELETS 10*3/mm3 336 305 313   IMM GRAN % % 0.5 0.4 0.5   NEUTROS ABS 10*3/mm3 6.95 6.59 9.11*   LYMPHS ABS 10*3/mm3 2.36 2.14 1.72   MONOS ABS 10*3/mm3 0.71 0.66 0.77   EOS ABS 10*3/mm3 0.11 0.09 0.06   BASOS ABS 10*3/mm3 0.07 0.06 0.06   IMMATURE GRANS (ABS) 10*3/mm3 0.05 0.04 0.06*   NRBC /100 WBC 0.0 0.0 0.0       Lab Results - Last 18 Months   Lab Units 03/03/22  1301 07/07/21  1318 12/28/20  1237   GLUCOSE mg/dL 169* 145* 206*   SODIUM mmol/L 140 141 138   POTASSIUM mmol/L 4.4 3.8 3.4*   CO2 mmol/L 27.0 28.0 27.0   CHLORIDE mmol/L 103 105 102   ANION GAP mmol/L 10.0 8.0 9.0   CREATININE mg/dL 0.70 0.68 0.63   BUN mg/dL 15 11 11   BUN / CREAT RATIO  21.4 16.2 17.5   CALCIUM mg/dL 9.8 9.7 9.1   EGFR IF NONAFRICN AM mL/min/1.73  --  91 100   ALK PHOS U/L 94 95 103   TOTAL PROTEIN g/dL 7.7 7.1 7.4   ALT (SGPT) U/L 13 11 11   AST (SGOT) U/L 5 7 7   BILIRUBIN mg/dL 0.3 0.3 0.6   ALBUMIN g/dL 4.40 4.10 4.20   GLOBULIN gm/dL 3.3 3.0 3.2       Lab Results - Last 18 Months   Lab Units 03/03/22  1301 07/07/21  1318 12/28/20  1237   CEA ng/mL 3.24 4.13 3.97     I have reviewed the assessment and plan and verified the accuracy of it. No changes to  assessment and plan since the information was documented. Ludwin Whitaker MD 03/10/22     ASSESSMENT:   1. Right breast invasive ductal carcinoma:   Stage: At least IIIA (cT3, cN2a, M0, G3), ER (-)/SD (-). HER-2/[place] (-). High grade (20 mitoses per high-powered field).   Tumor [place]: Multiple solid nodules in the right breast (multiple lesions at 11 o'clock measuring 2.9 x 2.4 x 2.2 cm, 1.7 x 1.4 x 1.8 cm, 1.9 x 1.6 x 1.9 cm, 1.2 x 0.8 x 1.1 cm); a lesion at the 10 o'clock in the right breast measuring 1.4 x 1.2 x 1.6 cm; 3 nodules in the right axillary region measuring 2.9 x 2.7 x 2.1 cm, another 1.8 x 1.3 x 1.7 cm, and a third measuring 1.2 x 0.7 x 1 cm.   Complications of Tumor: Pain and fullness of the right breast and right axilla.   BRCA status (1 and 2): No mutations, 12/2013.   Tumor Status: Underwent neoadjuvant chemotherapy followed by bilateral mastectomies, 07/30/2013. Pathology revealed: 1. Breast and lymph node, right modified radical mastectomy: Poorly differentiated infiltrating ductal cell carcinoma with extensive necrosis. (Margins of resection free of involvement). Fibrocystic changes. Fibroadenoma. Metastatic carcinoma in lymph nodes (2 out of 27 lymph nodes). 2. Breast, total left mastectomy: Nonproliferative fibrocystic changes (negative for malignancy). Adjuvant cisplatin and radiation completed. LENARD.  2. Mental retardation.   3. Mild neutrophilic leukocytosis, NOS.   --Flow cytometry, 11/16/2016 (above) unrevealing.  No localizing signs/symptoms.    --Normal counts, 03/03/2022  4. Borderline erythrocytosis.  Normal counts, 03/03/2022.  5. Lymphedema both arms. Asymptomatic.   6. Hypothyroidism.   7. Mildly abnormal CEA.   --Stable, 3.24, 03/03/2022 (prior range: 2.2 - 5.2).   8. Obesity.   9. Chronic kidney disease, GFR 57.35 mL/min, 01/27/2014, and 02/12/2014.   --Stable,  mL/min, 03/03/2022 (prior range: 51 - 100 mL/min).   10. Abnormal chest x-ray, 02/06/2015. Atelectasis versus  "small area of pneumonia.   a. Repeat chest x-ray 04/06/2015 was essentially unchanged, PCP says \"everything okay.\"   b. Chest x-ray, 10/16/2015 and 03/11/2016 at [facility]: No active disease.   c. [facility], Chest x-ray, 06/09/2016. Impression: Chronic elevation of the right diaphragm. No acute cardiac or pulmonary abnormality is identified.   d. Chest x-ray performed at [facility] on 4/28/2017. Impression: Chronic elevation of the right diaphragm. No acute cardiac or pulmonary abnormality is identified.       11.  Hyperglycemia.  On metformin.    PLAN:  1.  Apprised of labs, 03/03/2022 with normal CBC, including normal MCV without anemia, normal platelets, borderline hyperglycemia with otherwise normal CMP, elevated (stable) CEA, and normal CA27-29.  2. Remind them of the plans as outlined by Dr. Puente on 09/30/2013 (date she saw the patient). Dr. Puente suggested a PET/CT (above) and suggested considering BRCA testing (no mutations, 12/2013). Dr. Puente recommended: Cisplatin 25 mg/m2 every 3 weeks x4 cycles, otherwise observation with treatment at the time of relapse. Dr. Puente agrees that she is at high risk for relapse.   3.  Observe.   4.  Continue ongoing management per primary care physician and other specialists.   5.  Return to the [place] office in 24 weeks with pre-office CMP, CEA, CA27-29, and CBC with differential.    MEDICAL DECISION MAKING: Low Complexity   AMOUNT OF DATA: Limited    I spent ~20 minutes caring for Gopal on this date of service. This time includes time spent by me in the following activities: preparing for the visit, reviewing tests, performing a medically appropriate examination and/or evaluation, counseling and educating the patient/family/caregiver, ordering medications, tests, or procedures and documenting information in the medical record      cc: MD Evi Mast MD Patricia Williams, MD Peter Locken, " MD

## 2022-03-10 ENCOUNTER — OFFICE VISIT (OUTPATIENT)
Dept: ONCOLOGY | Facility: CLINIC | Age: 52
End: 2022-03-10

## 2022-03-10 VITALS
DIASTOLIC BLOOD PRESSURE: 76 MMHG | TEMPERATURE: 97.7 F | SYSTOLIC BLOOD PRESSURE: 128 MMHG | RESPIRATION RATE: 16 BRPM | HEIGHT: 63 IN | HEART RATE: 91 BPM | BODY MASS INDEX: 44.19 KG/M2 | WEIGHT: 249.4 LBS | OXYGEN SATURATION: 98 %

## 2022-03-10 DIAGNOSIS — Z17.1 MALIGNANT NEOPLASM OF RIGHT BREAST IN FEMALE, ESTROGEN RECEPTOR NEGATIVE, UNSPECIFIED SITE OF BREAST: Primary | ICD-10-CM

## 2022-03-10 DIAGNOSIS — C50.911 MALIGNANT NEOPLASM OF RIGHT BREAST IN FEMALE, ESTROGEN RECEPTOR NEGATIVE, UNSPECIFIED SITE OF BREAST: Primary | ICD-10-CM

## 2022-03-10 PROBLEM — E55.9 VITAMIN D DEFICIENCY: Status: ACTIVE | Noted: 2022-03-10

## 2022-03-10 PROBLEM — E78.2 MIXED HYPERLIPIDEMIA: Status: ACTIVE | Noted: 2022-03-10

## 2022-03-10 PROBLEM — E03.9 HYPOTHYROIDISM: Status: ACTIVE | Noted: 2022-03-10

## 2022-03-10 PROCEDURE — 99213 OFFICE O/P EST LOW 20 MIN: CPT | Performed by: INTERNAL MEDICINE

## 2022-09-01 ENCOUNTER — LAB (OUTPATIENT)
Dept: LAB | Facility: HOSPITAL | Age: 52
End: 2022-09-01

## 2022-09-01 DIAGNOSIS — C50.911 MALIGNANT NEOPLASM OF RIGHT BREAST IN FEMALE, ESTROGEN RECEPTOR NEGATIVE, UNSPECIFIED SITE OF BREAST: ICD-10-CM

## 2022-09-01 DIAGNOSIS — Z17.1 MALIGNANT NEOPLASM OF RIGHT BREAST IN FEMALE, ESTROGEN RECEPTOR NEGATIVE, UNSPECIFIED SITE OF BREAST: ICD-10-CM

## 2022-09-01 LAB
ALBUMIN SERPL-MCNC: 4.7 G/DL (ref 3.5–5.2)
ALBUMIN/GLOB SERPL: 1.5 G/DL
ALP SERPL-CCNC: 104 U/L (ref 39–117)
ALT SERPL W P-5'-P-CCNC: 15 U/L (ref 1–33)
ANION GAP SERPL CALCULATED.3IONS-SCNC: 12 MMOL/L (ref 5–15)
AST SERPL-CCNC: 6 U/L (ref 1–32)
BASOPHILS # BLD AUTO: 0.05 10*3/MM3 (ref 0–0.2)
BASOPHILS NFR BLD AUTO: 0.5 % (ref 0–1.5)
BILIRUB SERPL-MCNC: 0.3 MG/DL (ref 0–1.2)
BUN SERPL-MCNC: 19 MG/DL (ref 6–20)
BUN/CREAT SERPL: 24.7 (ref 7–25)
CALCIUM SPEC-SCNC: 10.3 MG/DL (ref 8.6–10.5)
CHLORIDE SERPL-SCNC: 98 MMOL/L (ref 98–107)
CO2 SERPL-SCNC: 27 MMOL/L (ref 22–29)
CREAT SERPL-MCNC: 0.77 MG/DL (ref 0.57–1)
DEPRECATED RDW RBC AUTO: 46.7 FL (ref 37–54)
EGFRCR SERPLBLD CKD-EPI 2021: 92.4 ML/MIN/1.73
EOSINOPHIL # BLD AUTO: 0.11 10*3/MM3 (ref 0–0.4)
EOSINOPHIL NFR BLD AUTO: 1 % (ref 0.3–6.2)
ERYTHROCYTE [DISTWIDTH] IN BLOOD BY AUTOMATED COUNT: 13.3 % (ref 12.3–15.4)
GLOBULIN UR ELPH-MCNC: 3.2 GM/DL
GLUCOSE SERPL-MCNC: 166 MG/DL (ref 65–99)
HCT VFR BLD AUTO: 45.9 % (ref 34–46.6)
HGB BLD-MCNC: 14.8 G/DL (ref 12–15.9)
IMM GRANULOCYTES # BLD AUTO: 0.06 10*3/MM3 (ref 0–0.05)
IMM GRANULOCYTES NFR BLD AUTO: 0.6 % (ref 0–0.5)
LYMPHOCYTES # BLD AUTO: 2.63 10*3/MM3 (ref 0.7–3.1)
LYMPHOCYTES NFR BLD AUTO: 25 % (ref 19.6–45.3)
MCH RBC QN AUTO: 30.5 PG (ref 26.6–33)
MCHC RBC AUTO-ENTMCNC: 32.2 G/DL (ref 31.5–35.7)
MCV RBC AUTO: 94.6 FL (ref 79–97)
MONOCYTES # BLD AUTO: 0.81 10*3/MM3 (ref 0.1–0.9)
MONOCYTES NFR BLD AUTO: 7.7 % (ref 5–12)
NEUTROPHILS NFR BLD AUTO: 6.84 10*3/MM3 (ref 1.7–7)
NEUTROPHILS NFR BLD AUTO: 65.2 % (ref 42.7–76)
NRBC BLD AUTO-RTO: 0 /100 WBC (ref 0–0.2)
PLATELET # BLD AUTO: 337 10*3/MM3 (ref 140–450)
PMV BLD AUTO: 9.9 FL (ref 6–12)
POTASSIUM SERPL-SCNC: 4.5 MMOL/L (ref 3.5–5.2)
PROT SERPL-MCNC: 7.9 G/DL (ref 6–8.5)
RBC # BLD AUTO: 4.85 10*6/MM3 (ref 3.77–5.28)
SODIUM SERPL-SCNC: 137 MMOL/L (ref 136–145)
WBC NRBC COR # BLD: 10.5 10*3/MM3 (ref 3.4–10.8)

## 2022-09-01 PROCEDURE — 85025 COMPLETE CBC W/AUTO DIFF WBC: CPT

## 2022-09-01 PROCEDURE — 80053 COMPREHEN METABOLIC PANEL: CPT

## 2022-09-01 PROCEDURE — 86300 IMMUNOASSAY TUMOR CA 15-3: CPT

## 2022-09-01 PROCEDURE — 82378 CARCINOEMBRYONIC ANTIGEN: CPT

## 2022-09-01 PROCEDURE — 36415 COLL VENOUS BLD VENIPUNCTURE: CPT

## 2022-09-02 LAB
CANCER AG27-29 SERPL-ACNC: 22.8 U/ML (ref 0–38.6)
CEA SERPL-MCNC: 3.74 NG/ML

## 2022-09-02 NOTE — PROGRESS NOTES
[facility] ONC [facility] ONCOLOGY  [address] [place] [address] [address]  [facility] [address]  [phone]    Patient Name: Gopal Powers  Encounter Date: 09/08/2022  YOB: 1970  Patient Number: [id number]     REASON FOR VISIT:  Ms. Gopal Powers is a 52-year-old female who returns in follow-up of locally advanced breast cancer. She is seen 111 months since completing cycle 6 of neoadjuvant ACT chemotherapy. She underwent bilateral mastectomies on 07/30/2013 and is now ~ 106 months since cycle 4 of adjuvant cisplatin. She completed adjuvant radiation 98 months ago on 07/14/2014. She is here with her sister, Shayla (previously with her brother-in-law, Padilla).     I have reviewed the HPI and verified with the patient the accuracy of it. No changes to interval history since the information was documented. Ludwin Whitaker MD 09/08/22     DIAGNOSTIC ABNORMALITIES:   1. Bilateral mammogram and right breast ultrasound, 12/18/2012, [facility]: Nodule just superior and lateral to the right nipple measuring 15 x 16 mm. Another density in the upper outer quadrant measures 12 x 17 mm. Deep to this is a large nodular group of nodules that measure 3.4 x 3.6 cm. Another 3 cm nodule, probable enlarged lymph node, in the right axillary region. Ultrasound showed multiple lesions at the 11 o'clock position. One measures 2.9 x 2.4 x 2 cm. Another 1.7 x 1.4 x 1.8 cm. Another 1.9 x 1.6 x 1.9 cm (the latter predominantly solid). The 4th lesion at the 11 o'clock position measures 1.2 x 0.8 x 1.1 cm. Three nodules in the right axillary region; one measures 2.9 x 2.7 x 2.1 cm, another 1.8 x 1.3 x 1.7 cm, and a third measures 1.2 x 0.7 x 1 cm. The lesion at the 10 o'clock position in the right breast measures 1.4 x 1.2 x 1.6 cm. This appears lobulated, predominantly solid.  2. Chest x-ray, 01/04/2013: Negative chest radiograph.  3. Ultrasound-guided biopsy of the right breast masses,  01/07/2013: Large lobulated mass in the upper outer quadrant of the right breast and the retroareolar area of the right breast. Noted was an enlarged pathologic appearing right axillary node which was not biopsied. Pathology from the specimens taken during the procedure included the biopsy from the 10 o'clock position (5.7 cm lesion) revealing infiltrating mammary carcinoma. Tumor present in virtually every biopsy. Tumor high grade (20 mitoses per high-powered field). Tumor measured 1 cm in greatest linear dimension. The specimen from the 11 o'clock position in the retroareolar area measured 1.7 cm. Infiltrating mammary carcinoma. Tumor present in virtually every biopsy. Tumor high grade. Tumor measured 1.1 cm in greatest linear dimension.  4. CT scan chest/abdomen/pelvis 01/21/2013, [facility]: Right axillary lymphadenopathy (the more anterior of 2 nodules measures 3.5 x 2.7 cm; the more posterior measures 2.6 x 2.1 cm). Skin thickening in the partially visualized right breast. Left ovary enlarged with a dermoid tumor within it. Previous cholecystectomy.  5. Labs, 02/01/2013: Hemoglobin 15.8, hematocrit 49, MCV 93.1, platelets 350,000, WBC 11.7, ANC 8.6. CMP revealed glucose 164, potassium 3.1, SGOT 6, GFR 97.1. CEA 3.2, CA27-29 of 22.9.  6. 2D echo, 02/05/2013, [facility]: Technically limited and suboptimal study. Left ventricular chamber size, wall thickness, and wall motion normal. No segmental wall motion abnormalities noted. Left ventricular (LV) systolic function normal. Ejection fraction measured at 63%. Left ventricular (LV) diastolic parameters normal. Both atria are normal size. No atrial clots or masses seen. Mitral valve leaflets normal thickness and excursion. No mitral regurgitation. Aortic valve leaflets normal thickness and excursion. Minimal aortic regurgitation. No aortic stenosis. Tricuspid valve anatomically normal. No tricuspid regurgitation. No pericardial  effusion.  7. PET scan, 10/15/2013, [facility]: Abnormal uptake in the chest wall bilaterally that appears to correspond to areas of scarring in both breasts (likely related to the recent surgery), greater on the right side with SUV max 6.9. Multiple areas of paravertebral uptake in the thorax, likely muscular or related to brown fat uptake. Mild uptake in 3 small normal sized lymph nodes in the left axilla. SUV max of the most intense uptake 2. 4.4 cm dermoid tumor in the pelvis in the midline anterior to the uterine fundus, likely arising from the left ovary. Right ovary seen separately. No other areas of pathologic uptake are seen within the neck, chest, abdomen, or pelvis.  8. BRCA1 and 2, 12/02/2012, [facility]: No mutations detected.  9. Surgical pathology, left tube and ovary, 05/29/2014, [facility]: Fallopian tube showed no significant pathologic changes. Ovary showed benign mature teratoma.  10. Peripheral blood comprehensive report, 11/16/2016: Mild absolute leukocytosis. COMMENT. Peripheral blood evaluation: Leukocytosis comprised predominantly of granulocytes with a normal complement of lymphocytes, monocytes, and platelets. Flow cytometric studies: No evidence of significant immunophenotypic aberrancies. No specific findings in the peripheral blood to suggest a cause for the patient's relative neutrophilia. Diagnosis of a myeloid stem cell disorder, if clinically suspected, is best made using bone marrow biopsy. Peripheral blood may not always be fully representative of an underlying bone marrow disorder. Clinical correlation recommended.    PREVIOUS INTERVENTIONS:   1. Neoadjuvant ACT chemotherapy, 02/18/2013 through 06/18/2013. 6 cycles. Prior cycles of chemo delayed due to shingles outbreak.  2. Right modified radical mastectomy (MRM), left prophylactic mastectomy, 07/30/2013. Dr. Mejia. Pathology revealed: 1) Breast and lymph node, right modified radical mastectomy: Poorly  differentiated infiltrating ductal cell carcinoma with extensive necrosis. (Margins of resection free of involvement). Fibrocystic changes. Fibroadenoma. Metastatic carcinoma in lymph nodes (2 out of 27 lymph nodes). 2) Breast, total left mastectomy: Nonproliferative fibrocystic changes (negative for malignancy). ER: Absent/Negative 0.81%. NH: Absent/Negative 0.09 %. HER-2: Not over-expressed. 1+  3. Adjuvant cisplatin chemotherapy, 11/06/2013 through 01/16/2014. 4 cycles.  4. Adjuvant radiation beginning 05/13/2014 through 07/14/2014 (5040 cGy with 1000 cGy boost).        Problem List Items Addressed This Visit        Other    Malignant neoplasm of right female breast (HCC) - Primary        Oncology/Hematology History   Malignant neoplasm of right female breast (HCC)   9/2/2016 Initial Diagnosis    Malignant neoplasm of right female breast (CMS/HCC)     12/13/2019 Cancer Staged    Staging form: Breast, AJCC V7  - Clinical stage from 12/13/2019: Stage IIIA (T3(m), N2a, M0) - Signed by Ludwin Whitaker MD on 12/13/2019         PAST MEDICAL HISTORY:  ALLERGIES:  No Known Allergies  CURRENT MEDICATIONS:  Outpatient Encounter Medications as of 9/8/2022   Medication Sig Dispense Refill   • amLODIPine (NORVASC) 5 MG tablet Take 5 mg by mouth Daily.  3   • atorvastatin (LIPITOR) 40 MG tablet Take 40 mg by mouth Daily.  3   • levothyroxine (SYNTHROID, LEVOTHROID) 50 MCG tablet Take 50 mcg by mouth Every Morning Before Breakfast.  3   • metFORMIN ER (GLUCOPHAGE-XR) 500 MG 24 hr tablet        No facility-administered encounter medications on file as of 9/8/2022.     ADULT ILLNESSES:   Breast cancer ( ER Status: Negative; NH Status: Negative; ICD-10:C50.411 ;Malignant neoplasm of upper-outer quadrant of right female breast )   Abnormal chest x ray ( ICD-10:R93.8 ;Abnormal findings on diagnostic imaging of other specified body structures   Chronic kidney disease ( ICD-10:N18.3 ;Chronic kidney disease, stage 3 (moderate)    Disorder of breast (disorder) ( lesion; ICD-10:D24.1 ;Benign neoplasm of right breast )   History of mastectomy ( ICD-10:Z90.11 ;Acquired absence of right breast and nipple   Hyperlipidemia ( ICD-10:E78.5 ;Hyperlipidemia, unspecified   Hypertension ( ICD-10:I10 ;Essential (primary) hypertension   Hypothyroidism ( ICD-10:E03.9 ;Hypothyroidism, unspecified   Leukocytosis ( ICD-10:D72.829 ;Elevated white blood cell count, unspecified   Mental retardation ( ICD-10:F79 ;Unspecified intellectual disabilities   Obesity ( ICD-10:E66.9 ;Obesity, unspecified   Wound dehiscence ( right mastectomy; ICD-10:T81.31xS ;Disruption of external operation (surgical) wound, not elsewhere classified, sequela )    SURGERIES:   Removal of left port and placement of right subclavian port, 03/05/2013. Dr. Mejia   Left clavicle repair of childhood fracture   Excision of breast lump: Right breast mass excision, 11/10/2011, Dr. Saini   Port removal (right subclavian) sometime 01/23/2017. Dr. Baldwin   Laparoscopic cholecystectomy, 2001, Dr. Flynn   Oophorectomy, left, 06/29/2014, Dr. Dodson   Oophorectomy, right, 05/29/2014. A 4.4 cm dermoid tumor. Pathology showed benign teratoma   Left subclavian Mediport, 02/08/2013, Dr. Mejia      ADULT ILLNESSES:  Patient Active Problem List   Diagnosis Code   • Malignant neoplasm of right female breast (HCC) C50.911   • Port catheter in place Z95.828   • Hypothyroidism E03.9   • Mixed hyperlipidemia E78.2   • Vitamin D deficiency E55.9     SURGERIES:  No past surgical history on file.  HEALTH MAINTENANCE ITEMS:  Health Maintenance Due   Topic Date Due   • URINE MICROALBUMIN  Never done   • COLORECTAL CANCER SCREENING  Never done   • COVID-19 Vaccine (1) Never done   • Pneumococcal Vaccine 0-64 (1 - PCV) Never done   • TDAP/TD VACCINES (1 - Tdap) Never done   • ZOSTER VACCINE (1 of 2) Never done   • HEPATITIS C SCREENING  Never done   • DIABETIC FOOT EXAM  Never done   • PAP SMEAR  Never done   •  "DIABETIC EYE EXAM  Never done   • ANNUAL WELLNESS VISIT  06/03/2021       <no information>  Last Completed Colonoscopy     This patient has no relevant Health Maintenance data.          There is no immunization history on file for this patient.  Last Completed Mammogram     This patient has no relevant Health Maintenance data.            FAMILY HISTORY:  No family history on file.  SOCIAL HISTORY:  Social History     Socioeconomic History   • Marital status: Single   Tobacco Use   • Smoking status: Never Smoker   • Smokeless tobacco: Never Used       REVIEW OF SYSTEMS:  Constitutional:   The patient's appetite is good, sister says, \"she eats at night when no one is watching.\" Her energy is fairly good, sister again says, \"She's doing pretty good otherwise.\" Sister says she is more active. She has regained 14 pounds (in addition to 14 pounds at her prior visit-had intentionally lost 52 pounds at her 3 visits prior to that) since her last visit. Sister says she has been supervising her meals, \"but she still sneaks in snacks when I'm asleep.\" She has no fevers, chills, or drenching night sweats. Her sleep habits seem appropriate.  Ear/Nose/Mouth/Throat:   She reports no ear pains, but has allergy sinus symptoms, without sore throat, nosebleeds, or sore tongue. She has no headaches. She denies any hoarseness, change in voice quality.  Ocular:   She reports no eye pain, significant change in visual acuity, double vision, or blurry vision.  Respiratory:   She has baseline exertional dyspnea but has no shortness of breath with her routine activities. She reports no cough, no significant shortness of breathing at rest, phlegm production, or unexplained chest wall pain.  Cardiovascular:   She reports no exertional chest pain, chest pressure, or chest heaviness. She has no claudication. She reports no palpitations or symptomatic orthostasis.  Gastrointestinal:   She reports no dysphagia, nausea, vomiting, postprandial " "abdominal pain, bloating, cramping, change in bowel habits, or discoloration of the stool. She reports no rectal bleeding. She has not had any constipation but still has bouts of soft stools (chronic) but no overt diarrhea.  Genitourinary:   She reports no urinary burning, frequency, dribbling, or discoloration. She reports no need to urinate frequently through the night. She reports no difficulty controlling her bladder.  GYN:   She has no unexplained vaginal bleeding and no significant vaginal discharge.  Breasts:   She denies any more \"soreness\" from the surgical sites.  Musculoskeletal:   She reports no unexplained arthralgias, myalgias, or nighttime leg cramping.  Extremities:   She reports trouble with fluid retention in the arms but no significant leg swelling.  Endocrine:   She reports no problems with excess thirst, excessive urination, vasomotor instability, or unexplained fatigue.  Heme/Lymphatic:   She reports no unexplained bleeding, bruising, petechial rash, or swollen glands.  Skin:   She has intermittent itching or rash around the lower abdominal incision site. No recurrent shingles on her back.  Neuro:   She reports no loss of consciousness, seizures, fainting spells, or dizziness. She has no weakness of her face, arms, or legs. She has no difficulty with speech. She has no tremors.  Psych:   She seems generally satisfied with life. She denies depression nor anxiety. She reports no mood swings.      VITAL SIGNS: /82   Pulse 104   Temp 97.3 °F (36.3 °C)   Resp 18   Ht 160 cm (63\")   Wt 119 kg (263 lb 6.4 oz)   SpO2 97%   Breastfeeding No   BMI 46.66 kg/m² Body surface area is 2.17 meters squared.  Pain Score    09/08/22 1411   PainSc: 0-No pain         PHYSICAL EXAMINATION:   General:   She is a pleasant, morbidly obese and modestly-kept female who is comfortable at rest. She arrived in the exam room ambulatory. She appears to be her stated age. Her skin color is normal. ECOG PS = " 0  Head/Neck:   The patient is anicteric and atraumatic. She is wearing a surgical mask.  The trachea is midline. The neck is supple without evidence of jugular venous distention or cervical adenopathy.  Eyes:   The pupils are equal, round, and reactive to light. The extraocular movements are full. There is no scleral jaundice or erythema.  Chest:   The respiratory efforts are normal and unhindered. The chest is clear to auscultation and percussion. There are no wheezes, rhonchi, rales, or asymmetry of breath sounds.  Breasts:   Chaperoned exam:  Marni Franklin MA  Inspection of the mastectomy sites is again notable for healed wounds with underlying scar thickness and overlying skin folds (left over post-op) bilaterally. There is no axillary adenopathy. The previous Mediport site in the left upper chest is healed. The right-sided Mediport has been removed (sometime 01/23/2017 per Dr. Baldwin) after it flipped over. The site is healed with moderate scar formation.  Cardiovascular:   The patient has a regular cardiac rate and rhythm without murmurs, rubs, or gallops. The peripheral pulses are equal and full.  Abdomen:   The belly is soft and obese. Her habitus precludes an adequate exam. There is no rebound or guarding. There is no obvious organomegaly, mass-effect, or tenderness. Bowel sounds are active and of normal character. The lower abdominal incision is healed.   Extremities:   There is (subtle) arm lymphedema, right greater than left with no evidence of cyanosis or clubbing with 1+ ankle edema.  Rheumatologic:   There is no overt evidence of rheumatoid deformities of the hands. There is no sausaging of the fingers. There is no sign of active synovitis. The gait is slow but independent.  Cutaneous:   There are no overt rashes, disseminated lesions, purpura, or petechiae.   Lymphatics:   There is no evidence of adenopathy in the cervical, supraclavicular, or axillary areas.  Neurologic:   The patient is alert,  oriented, cooperative, and pleasant. She acknowledges me but is not conversant. She ambulated into the exam room without assistance and transferred from chair to exam table unassisted today. There is no overt dysfunction of the motor, sensory, or cerebellar systems.  Psych:   Mood and affect are appropriate for circumstance. Eye contact is appropriate.        LABS    Lab Results - Last 18 Months   Lab Units 09/01/22  1413 03/03/22  1301 07/07/21  1318   HEMOGLOBIN g/dL 14.8 14.7 13.8   HEMATOCRIT % 45.9 46.0 42.6   MCV fL 94.6 94.5 93.8   WBC 10*3/mm3 10.50 10.25 9.58   RDW % 13.3 13.3 13.2   MPV fL 9.9 10.2 10.5   PLATELETS 10*3/mm3 337 336 305   IMM GRAN % % 0.6* 0.5 0.4   NEUTROS ABS 10*3/mm3 6.84 6.95 6.59   LYMPHS ABS 10*3/mm3 2.63 2.36 2.14   MONOS ABS 10*3/mm3 0.81 0.71 0.66   EOS ABS 10*3/mm3 0.11 0.11 0.09   BASOS ABS 10*3/mm3 0.05 0.07 0.06   IMMATURE GRANS (ABS) 10*3/mm3 0.06* 0.05 0.04   NRBC /100 WBC 0.0 0.0 0.0       Lab Results - Last 18 Months   Lab Units 09/01/22  1413 03/03/22  1301 07/07/21  1318   GLUCOSE mg/dL 166* 169* 145*   SODIUM mmol/L 137 140 141   POTASSIUM mmol/L 4.5 4.4 3.8   CO2 mmol/L 27.0 27.0 28.0   CHLORIDE mmol/L 98 103 105   ANION GAP mmol/L 12.0 10.0 8.0   CREATININE mg/dL 0.77 0.70 0.68   BUN mg/dL 19 15 11   BUN / CREAT RATIO  24.7 21.4 16.2   CALCIUM mg/dL 10.3 9.8 9.7   EGFR IF NONAFRICN AM mL/min/1.73  --   --  91   ALK PHOS U/L 104 94 95   TOTAL PROTEIN g/dL 7.9 7.7 7.1   ALT (SGPT) U/L 15 13 11   AST (SGOT) U/L 6 5 7   BILIRUBIN mg/dL 0.3 0.3 0.3   ALBUMIN g/dL 4.70 4.40 4.10   GLOBULIN gm/dL 3.2 3.3 3.0       Lab Results - Last 18 Months   Lab Units 09/01/22  1413 03/03/22  1301 07/07/21  1318   CEA ng/mL 3.74 3.24 4.13         ASSESSMENT:   1. Right breast invasive ductal carcinoma:   Stage: At least IIIA (cT3, cN2a, M0, G3), ER (-)/TX (-). HER-2/avelina (-). High grade (20 mitoses per high-powered field).   Tumor Wesley: Multiple solid nodules in the right breast (multiple  "lesions at 11 o'clock measuring 2.9 x 2.4 x 2.2 cm, 1.7 x 1.4 x 1.8 cm, 1.9 x 1.6 x 1.9 cm, 1.2 x 0.8 x 1.1 cm); a lesion at the 10 o'clock in the right breast measuring 1.4 x 1.2 x 1.6 cm; 3 nodules in the right axillary region measuring 2.9 x 2.7 x 2.1 cm, another 1.8 x 1.3 x 1.7 cm, and a third measuring 1.2 x 0.7 x 1 cm.   Complications of Tumor: Pain and fullness of the right breast and right axilla.   BRCA status (1 and 2): No mutations, 12/2013.   Tumor Status: Underwent neoadjuvant chemotherapy followed by bilateral mastectomies, 07/30/2013. Pathology revealed: 1. Breast and lymph node, right modified radical mastectomy: Poorly differentiated infiltrating ductal cell carcinoma with extensive necrosis. (Margins of resection free of involvement). Fibrocystic changes. Fibroadenoma. Metastatic carcinoma in lymph nodes (2 out of 27 lymph nodes). 2. Breast, total left mastectomy: Nonproliferative fibrocystic changes (negative for malignancy). Adjuvant cisplatin and radiation completed. LENARD.  2. Mental retardation.   3. Mild neutrophilic leukocytosis, NOS.   --Flow cytometry, 11/16/2016 (above) unrevealing.  No localizing signs/symptoms.    --Normal counts since 03/03/2022  4. Borderline erythrocytosis.  Normal counts since 03/03/2022.  5. Lymphedema both arms. Asymptomatic.   6. Hypothyroidism.   7. Mildly abnormal CEA.   --Stable, 3.74, 09/01/2022 (prior range: 2.2 - 5.2).   8. Obesity.   9. Chronic kidney disease, GFR 57.35 mL/min, 01/27/2014, and 02/12/2014.   --Stable, GFR 92.4 mL/min, 09/01/2022 (prior range: 51 - 104 mL/min).   10. Abnormal chest x-ray, 02/06/2015. Atelectasis versus small area of pneumonia.   a. Repeat chest x-ray 04/06/2015 was essentially unchanged, PCP says \"everything okay.\"   b. Chest x-ray, 10/16/2015 and 03/11/2016 at [facility]: No active disease.   c. [facility], Chest x-ray, 06/09/2016. Impression: Chronic elevation of the right diaphragm. " No acute cardiac or pulmonary abnormality is identified.   d. Chest x-ray performed at [facility] on 4/28/2017. Impression: Chronic elevation of the right diaphragm. No acute cardiac or pulmonary abnormality is identified.       11.  Hyperglycemia.  On metformin.    PLAN:  1.  Apprised of labs, 09/01/2022 with normal CBC, including normal MCV without anemia, normal platelets, hyperglycemia (166) with otherwise normal CMP, trivially elevated (stable) CEA, and normal CA27-29.    2. Remind them of the plans as outlined by Dr. Puente on 09/30/2013 (date she saw the patient). Dr. Puente suggested a PET/CT (above) and suggested considering BRCA testing (no mutations, 12/2013). Dr. Puente recommended: Cisplatin 25 mg/m2 every 3 weeks x4 cycles, otherwise observation with treatment at the time of relapse. Dr. Puente agrees that she is at high risk for relapse.   3.  Observe.   4.  Continue ongoing management per primary care physician and other specialists.   5.  Return to the [place] office in 24 weeks with pre-office CMP, CEA, CA27-29, and CBC with differential.      I spent ~27 minutes caring for Gopal on this date of service. This time includes time spent by me in the following activities: preparing for the visit, reviewing tests, performing a medically appropriate examination and/or evaluation, counseling and educating the patient/family/caregiver, ordering medications, tests, or procedures and documenting information in the medical record      cc: MD Evi Mast MD Patricia Williams, MD Peter Locken, MD

## 2022-09-08 ENCOUNTER — OFFICE VISIT (OUTPATIENT)
Dept: ONCOLOGY | Facility: CLINIC | Age: 52
End: 2022-09-08

## 2022-09-08 VITALS
DIASTOLIC BLOOD PRESSURE: 82 MMHG | TEMPERATURE: 97.3 F | OXYGEN SATURATION: 97 % | SYSTOLIC BLOOD PRESSURE: 124 MMHG | HEIGHT: 63 IN | WEIGHT: 263.4 LBS | RESPIRATION RATE: 18 BRPM | BODY MASS INDEX: 46.67 KG/M2 | HEART RATE: 104 BPM

## 2022-09-08 DIAGNOSIS — C50.911 MALIGNANT NEOPLASM OF RIGHT BREAST IN FEMALE, ESTROGEN RECEPTOR NEGATIVE, UNSPECIFIED SITE OF BREAST: Primary | ICD-10-CM

## 2022-09-08 DIAGNOSIS — Z17.1 MALIGNANT NEOPLASM OF RIGHT BREAST IN FEMALE, ESTROGEN RECEPTOR NEGATIVE, UNSPECIFIED SITE OF BREAST: Primary | ICD-10-CM

## 2022-09-08 PROCEDURE — 99213 OFFICE O/P EST LOW 20 MIN: CPT | Performed by: INTERNAL MEDICINE

## 2023-03-01 ENCOUNTER — LAB (OUTPATIENT)
Dept: LAB | Facility: HOSPITAL | Age: 53
End: 2023-03-01
Payer: MEDICARE

## 2023-03-01 DIAGNOSIS — C50.911 MALIGNANT NEOPLASM OF RIGHT BREAST IN FEMALE, ESTROGEN RECEPTOR NEGATIVE, UNSPECIFIED SITE OF BREAST: ICD-10-CM

## 2023-03-01 DIAGNOSIS — Z17.1 MALIGNANT NEOPLASM OF RIGHT BREAST IN FEMALE, ESTROGEN RECEPTOR NEGATIVE, UNSPECIFIED SITE OF BREAST: ICD-10-CM

## 2023-03-01 LAB
ALBUMIN SERPL-MCNC: 4.6 G/DL (ref 3.5–5.2)
ALBUMIN/GLOB SERPL: 1.4 G/DL
ALP SERPL-CCNC: 106 U/L (ref 39–117)
ALT SERPL W P-5'-P-CCNC: 12 U/L (ref 1–33)
ANION GAP SERPL CALCULATED.3IONS-SCNC: 12 MMOL/L (ref 5–15)
AST SERPL-CCNC: 7 U/L (ref 1–32)
BASOPHILS # BLD AUTO: 0.05 10*3/MM3 (ref 0–0.2)
BASOPHILS NFR BLD AUTO: 0.5 % (ref 0–1.5)
BILIRUB SERPL-MCNC: 0.3 MG/DL (ref 0–1.2)
BUN SERPL-MCNC: 13 MG/DL (ref 6–20)
BUN/CREAT SERPL: 19.1 (ref 7–25)
CALCIUM SPEC-SCNC: 10 MG/DL (ref 8.6–10.5)
CHLORIDE SERPL-SCNC: 103 MMOL/L (ref 98–107)
CO2 SERPL-SCNC: 25 MMOL/L (ref 22–29)
CREAT SERPL-MCNC: 0.68 MG/DL (ref 0.57–1)
DEPRECATED RDW RBC AUTO: 45.9 FL (ref 37–54)
EGFRCR SERPLBLD CKD-EPI 2021: 104.3 ML/MIN/1.73
EOSINOPHIL # BLD AUTO: 0.12 10*3/MM3 (ref 0–0.4)
EOSINOPHIL NFR BLD AUTO: 1.1 % (ref 0.3–6.2)
ERYTHROCYTE [DISTWIDTH] IN BLOOD BY AUTOMATED COUNT: 13.1 % (ref 12.3–15.4)
GLOBULIN UR ELPH-MCNC: 3.3 GM/DL
GLUCOSE SERPL-MCNC: 146 MG/DL (ref 65–99)
HCT VFR BLD AUTO: 47.9 % (ref 34–46.6)
HGB BLD-MCNC: 15.2 G/DL (ref 12–15.9)
IMM GRANULOCYTES # BLD AUTO: 0.05 10*3/MM3 (ref 0–0.05)
IMM GRANULOCYTES NFR BLD AUTO: 0.5 % (ref 0–0.5)
LYMPHOCYTES # BLD AUTO: 2.66 10*3/MM3 (ref 0.7–3.1)
LYMPHOCYTES NFR BLD AUTO: 24.5 % (ref 19.6–45.3)
MCH RBC QN AUTO: 30.2 PG (ref 26.6–33)
MCHC RBC AUTO-ENTMCNC: 31.7 G/DL (ref 31.5–35.7)
MCV RBC AUTO: 95 FL (ref 79–97)
MONOCYTES # BLD AUTO: 0.82 10*3/MM3 (ref 0.1–0.9)
MONOCYTES NFR BLD AUTO: 7.5 % (ref 5–12)
NEUTROPHILS NFR BLD AUTO: 65.9 % (ref 42.7–76)
NEUTROPHILS NFR BLD AUTO: 7.17 10*3/MM3 (ref 1.7–7)
NRBC BLD AUTO-RTO: 0 /100 WBC (ref 0–0.2)
PLATELET # BLD AUTO: 353 10*3/MM3 (ref 140–450)
PMV BLD AUTO: 10.1 FL (ref 6–12)
POTASSIUM SERPL-SCNC: 4.7 MMOL/L (ref 3.5–5.2)
PROT SERPL-MCNC: 7.9 G/DL (ref 6–8.5)
RBC # BLD AUTO: 5.04 10*6/MM3 (ref 3.77–5.28)
SODIUM SERPL-SCNC: 140 MMOL/L (ref 136–145)
WBC NRBC COR # BLD: 10.87 10*3/MM3 (ref 3.4–10.8)

## 2023-03-01 PROCEDURE — 86300 IMMUNOASSAY TUMOR CA 15-3: CPT

## 2023-03-01 PROCEDURE — 36415 COLL VENOUS BLD VENIPUNCTURE: CPT

## 2023-03-01 PROCEDURE — 82378 CARCINOEMBRYONIC ANTIGEN: CPT

## 2023-03-01 PROCEDURE — 85025 COMPLETE CBC W/AUTO DIFF WBC: CPT

## 2023-03-01 PROCEDURE — 80053 COMPREHEN METABOLIC PANEL: CPT

## 2023-03-02 LAB
CANCER AG27-29 SERPL-ACNC: 24.7 U/ML (ref 0–38.6)
CEA SERPL-MCNC: 4.9 NG/ML

## 2023-03-04 NOTE — PROGRESS NOTES
[facility]W ONC [facility] ONCOLOGY  [address] [place] [address] [address]  [facility] [id number]  [phone]    Patient Name: Gopal Powers  Encounter Date: 03/08/2023  YOB: 1970  Patient Number: [id number]       REASON FOR VISIT:  Ms. Gopal Powers is a 52-year-old female who returns in follow-up of locally advanced breast cancer. She is seen 117 months since completing cycle 6 of neoadjuvant ACT chemotherapy. She underwent bilateral mastectomies on 07/30/2013 and is now ~ 112 months since cycle 4 of adjuvant cisplatin. She completed adjuvant radiation 104 months ago on 07/14/2014. She is here with her sister, Shayla (previously with her brother-in-law, Padilla).     I have reviewed the HPI and verified with the patient the accuracy of it. No changes to interval history since the information was documented. Ludwin Whitaker MD 03/08/23       DIAGNOSTIC ABNORMALITIES:   1. Bilateral mammogram and right breast ultrasound, 12/18/2012, [facility]: Nodule just superior and lateral to the right nipple measuring 15 x 16 mm. Another density in the upper outer quadrant measures 12 x 17 mm. Deep to this is a large nodular group of nodules that measure 3.4 x 3.6 cm. Another 3 cm nodule, probable enlarged lymph node, in the right axillary region. Ultrasound showed multiple lesions at the 11 o'clock position. One measures 2.9 x 2.4 x 2 cm. Another 1.7 x 1.4 x 1.8 cm. Another 1.9 x 1.6 x 1.9 cm (the latter predominantly solid). The 4th lesion at the 11 o'clock position measures 1.2 x 0.8 x 1.1 cm. Three nodules in the right axillary region; one measures 2.9 x 2.7 x 2.1 cm, another 1.8 x 1.3 x 1.7 cm, and a third measures 1.2 x 0.7 x 1 cm. The lesion at the 10 o'clock position in the right breast measures 1.4 x 1.2 x 1.6 cm. This appears lobulated, predominantly solid.  2. Chest x-ray, 01/04/2013: Negative chest radiograph.  3. Ultrasound-guided biopsy of the right breast masses,  01/07/2013: Large lobulated mass in the upper outer quadrant of the right breast and the retroareolar area of the right breast. Noted was an enlarged pathologic appearing right axillary node which was not biopsied. Pathology from the specimens taken during the procedure included the biopsy from the 10 o'clock position (5.7 cm lesion) revealing infiltrating mammary carcinoma. Tumor present in virtually every biopsy. Tumor high grade (20 mitoses per high-powered field). Tumor measured 1 cm in greatest linear dimension. The specimen from the 11 o'clock position in the retroareolar area measured 1.7 cm. Infiltrating mammary carcinoma. Tumor present in virtually every biopsy. Tumor high grade. Tumor measured 1.1 cm in greatest linear dimension.  4. CT scan chest/abdomen/pelvis 01/21/2013, [facility]: Right axillary lymphadenopathy (the more anterior of 2 nodules measures 3.5 x 2.7 cm; the more posterior measures 2.6 x 2.1 cm). Skin thickening in the partially visualized right breast. Left ovary enlarged with a dermoid tumor within it. Previous cholecystectomy.  5. Labs, 02/01/2013: Hemoglobin 15.8, hematocrit 49, MCV 93.1, platelets 350,000, WBC 11.7, ANC 8.6. CMP revealed glucose 164, potassium 3.1, SGOT 6, GFR 97.1. CEA 3.2, CA27-29 of 22.9.  6. 2D echo, 02/05/2013, [facility]: Technically limited and suboptimal study. Left ventricular chamber size, wall thickness, and wall motion normal. No segmental wall motion abnormalities noted. Left ventricular (LV) systolic function normal. Ejection fraction measured at 63%. Left ventricular (LV) diastolic parameters normal. Both atria are normal size. No atrial clots or masses seen. Mitral valve leaflets normal thickness and excursion. No mitral regurgitation. Aortic valve leaflets normal thickness and excursion. Minimal aortic regurgitation. No aortic stenosis. Tricuspid valve anatomically normal. No tricuspid regurgitation. No pericardial  effusion.  7. PET scan, 10/15/2013, [facility]: Abnormal uptake in the chest wall bilaterally that appears to correspond to areas of scarring in both breasts (likely related to the recent surgery), greater on the right side with SUV max 6.9. Multiple areas of paravertebral uptake in the thorax, likely muscular or related to brown fat uptake. Mild uptake in 3 small normal sized lymph nodes in the left axilla. SUV max of the most intense uptake 2. 4.4 cm dermoid tumor in the pelvis in the midline anterior to the uterine fundus, likely arising from the left ovary. Right ovary seen separately. No other areas of pathologic uptake are seen within the neck, chest, abdomen, or pelvis.  8. BRCA1 and 2, 12/02/2012, [facility]: No mutations detected.  9. Surgical pathology, left tube and ovary, 05/29/2014, [facility]: Fallopian tube showed no significant pathologic changes. Ovary showed benign mature teratoma.  10. Peripheral blood comprehensive report, 11/16/2016: Mild absolute leukocytosis. COMMENT. Peripheral blood evaluation: Leukocytosis comprised predominantly of granulocytes with a normal complement of lymphocytes, monocytes, and platelets. Flow cytometric studies: No evidence of significant immunophenotypic aberrancies. No specific findings in the peripheral blood to suggest a cause for the patient's relative neutrophilia. Diagnosis of a myeloid stem cell disorder, if clinically suspected, is best made using bone marrow biopsy. Peripheral blood may not always be fully representative of an underlying bone marrow disorder. Clinical correlation recommended.    PREVIOUS INTERVENTIONS:   1. Neoadjuvant ACT chemotherapy, 02/18/2013 through 06/18/2013. 6 cycles. Prior cycles of chemo delayed due to shingles outbreak.  2. Right modified radical mastectomy (MRM), left prophylactic mastectomy, 07/30/2013. Dr. Mejia. Pathology revealed: 1) Breast and lymph node, right modified radical mastectomy: Poorly  differentiated infiltrating ductal cell carcinoma with extensive necrosis. (Margins of resection free of involvement). Fibrocystic changes. Fibroadenoma. Metastatic carcinoma in lymph nodes (2 out of 27 lymph nodes). 2) Breast, total left mastectomy: Nonproliferative fibrocystic changes (negative for malignancy). ER: Absent/Negative 0.81%. VT: Absent/Negative 0.09 %. HER-2: Not over-expressed. 1+  3. Adjuvant cisplatin chemotherapy, 11/06/2013 through 01/16/2014. 4 cycles.  4. Adjuvant radiation beginning 05/13/2014 through 07/14/2014 (5040 cGy with 1000 cGy boost).        Problem List Items Addressed This Visit        Other    Malignant neoplasm of right female breast (HCC) - Primary    Relevant Orders    Comprehensive Metabolic Panel    CEA    CBC & Differential    Cancer Antigen 27.29     Oncology/Hematology History   Malignant neoplasm of right female breast (HCC)   9/2/2016 Initial Diagnosis    Malignant neoplasm of right female breast (CMS/HCC)     12/13/2019 Cancer Staged    Staging form: Breast, AJCC V7  - Clinical stage from 12/13/2019: Stage IIIA (T3(m), N2a, M0) - Signed by Ludwin Whitaker MD on 12/13/2019         PAST MEDICAL HISTORY:  ALLERGIES:  No Known Allergies  CURRENT MEDICATIONS:  Outpatient Encounter Medications as of 3/8/2023   Medication Sig Dispense Refill   • amLODIPine (NORVASC) 5 MG tablet Take 1 tablet by mouth Daily.  3   • atorvastatin (LIPITOR) 40 MG tablet Take 1 tablet by mouth Daily.  3   • levothyroxine (SYNTHROID, LEVOTHROID) 50 MCG tablet Take 1 tablet by mouth Every Morning Before Breakfast.  3   • metFORMIN ER (GLUCOPHAGE-XR) 500 MG 24 hr tablet        No facility-administered encounter medications on file as of 3/8/2023.     ADULT ILLNESSES:   Breast cancer ( ER Status: Negative; VT Status: Negative; ICD-10:C50.411 ;Malignant neoplasm of upper-outer quadrant of right female breast )   Abnormal chest x ray ( ICD-10:R93.8 ;Abnormal findings on diagnostic imaging of other  specified body structures   Chronic kidney disease ( ICD-10:N18.3 ;Chronic kidney disease, stage 3 (moderate)   Disorder of breast (disorder) ( lesion; ICD-10:D24.1 ;Benign neoplasm of right breast )   History of mastectomy ( ICD-10:Z90.11 ;Acquired absence of right breast and nipple   Hyperlipidemia ( ICD-10:E78.5 ;Hyperlipidemia, unspecified   Hypertension ( ICD-10:I10 ;Essential (primary) hypertension   Hypothyroidism ( ICD-10:E03.9 ;Hypothyroidism, unspecified   Leukocytosis ( ICD-10:D72.829 ;Elevated white blood cell count, unspecified   Mental retardation ( ICD-10:F79 ;Unspecified intellectual disabilities   Obesity ( ICD-10:E66.9 ;Obesity, unspecified   Wound dehiscence ( right mastectomy; ICD-10:T81.31xS ;Disruption of external operation (surgical) wound, not elsewhere classified, sequela )    SURGERIES:   Removal of left port and placement of right subclavian port, 03/05/2013. Dr. Mejia   Left clavicle repair of childhood fracture   Excision of breast lump: Right breast mass excision, 11/10/2011, Dr. Saini   Port removal (right subclavian) sometime 01/23/2017. Dr. Baldwin   Laparoscopic cholecystectomy, 2001, Dr. Flynn   Oophorectomy, left, 06/29/2014, Dr. Dodson   Oophorectomy, right, 05/29/2014. A 4.4 cm dermoid tumor. Pathology showed benign teratoma   Left subclavian Mediport, 02/08/2013, Dr. Mejia      ADULT ILLNESSES:  Patient Active Problem List   Diagnosis Code   • Malignant neoplasm of right female breast (HCC) C50.911   • Port catheter in place Z95.828   • Hypothyroidism E03.9   • Mixed hyperlipidemia E78.2   • Vitamin D deficiency E55.9     SURGERIES:  No past surgical history on file.  HEALTH MAINTENANCE ITEMS:  Health Maintenance Due   Topic Date Due   • URINE MICROALBUMIN  Never done   • COLORECTAL CANCER SCREENING  Never done   • COVID-19 Vaccine (1) Never done   • Pneumococcal Vaccine 0-64 (1 - PCV) Never done   • TDAP/TD VACCINES (1 - Tdap) Never done   • ZOSTER VACCINE (1 of 2)  "Never done   • HEPATITIS C SCREENING  Never done   • DIABETIC FOOT EXAM  Never done   • PAP SMEAR  Never done   • DIABETIC EYE EXAM  Never done   • ANNUAL WELLNESS VISIT  06/03/2021   • INFLUENZA VACCINE  08/01/2022   • HEMOGLOBIN A1C  01/01/2023       <no information>  Last Completed Colonoscopy     This patient has no relevant Health Maintenance data.          There is no immunization history on file for this patient.  Last Completed Mammogram     This patient has no relevant Health Maintenance data.            FAMILY HISTORY:  History reviewed. No pertinent family history.  SOCIAL HISTORY:  Social History     Socioeconomic History   • Marital status: Single   Tobacco Use   • Smoking status: Never   • Smokeless tobacco: Never       REVIEW OF SYSTEMS:  Constitutional:   The patient's appetite is good, sister says, \"she eats in the middle of the night when no one is watching.\" Her energy is fairly good, sister again says, \"She's doing pretty good otherwise.\" Sister says she has been active at the adult . She has lost 2 lb (had gained 28 lb at her 2 prior visits-had intentionally lost 52 pounds at her 3 visits prior to those) since her last visit. Sister says she has been supervising her meals, \"she still manages to sneak in snacks when I'm asleep.\" She has no fevers, chills, or drenching night sweats. Her sleep habits seem appropriate.  Ear/Nose/Mouth/Throat:   She reports no ear pains, but has allergy sinus symptoms, without sore throat, nosebleeds, or sore tongue. She has no headaches. She denies any hoarseness, change in voice quality.  Ocular:   She reports no eye pain, significant change in visual acuity, double vision, or blurry vision.  Respiratory:   She has baseline exertional dyspnea but has no shortness of breath with her routine activities. She reports no cough, no significant shortness of breathing at rest, phlegm production, or unexplained chest wall pain.  Cardiovascular:   She reports no " "exertional chest pain, chest pressure, or chest heaviness. She has no claudication. She reports no palpitations or symptomatic orthostasis.  Gastrointestinal:   She reports no dysphagia, nausea, vomiting, postprandial abdominal pain, bloating, cramping, change in bowel habits, or discoloration of the stool. She reports no rectal bleeding. She has not had any constipation but still has bouts of soft stools (chronic) but no overt diarrhea.  Genitourinary:   She reports no urinary burning, frequency, dribbling, or discoloration. She reports no need to urinate frequently through the night. She reports no difficulty controlling her bladder.  GYN:   She has no unexplained vaginal bleeding and no significant vaginal discharge.  Breasts:   She denies any more discomfort nor \"soreness\" from the surgical sites.  Musculoskeletal:   She reports no unexplained arthralgias, myalgias, or nighttime leg cramping.  Extremities:   She reports trouble with fluid retention in the arms but no significant leg swelling.  Endocrine:   She reports no problems with excess thirst, excessive urination, vasomotor instability, or unexplained fatigue.  Heme/Lymphatic:   She reports no unexplained bleeding, bruising, petechial rash, or swollen glands.  Skin:   She has intermittent itching or rash around the lower abdominal incision site. No recurrent shingles on her back.  Neuro:   She reports no loss of consciousness, seizures, fainting spells, or dizziness. She has no weakness of her face, arms, or legs. She has no difficulty with speech. She has no tremors.  Psych:   She seems generally satisfied with life. She denies depression nor anxiety. She reports no mood swings.      VITAL SIGNS: /70   Pulse 88   Temp 97.5 °F (36.4 °C)   Resp 18   Ht 160 cm (63\")   Wt 118 kg (261 lb)   SpO2 99%   BMI 46.23 kg/m² Body surface area is 2.16 meters squared.  Pain Score    03/08/23 1501   PainSc: 0-No pain         PHYSICAL EXAMINATION:   General: "   She is a pleasant, morbidly obese and modestly-kept female who is comfortable at rest. She arrived in the exam room ambulatory. She appears to be her stated age. Her skin color is normal. ECOG PS = 0  Head/Neck:   The patient is anicteric and atraumatic. She is wearing a surgical mask.  The trachea is midline. The neck is supple without evidence of jugular venous distention or cervical adenopathy.  Eyes:   The pupils are equal, round, and reactive to light. The extraocular movements are full. There is no scleral jaundice or erythema.  Chest:   The respiratory efforts are normal and unhindered. The chest is clear to auscultation and percussion. There are no wheezes, rhonchi, rales, or asymmetry of breath sounds.  Breasts:         Chaperoned exam:  Shayla (patient sister)  Inspection of the mastectomy sites is again notable for healed wounds with underlying scar thickness and overlying skin folds (left over post-op) bilaterally. There is no axillary adenopathy. The previous Mediport site in the left upper chest is healed. The right-sided Mediport has been removed (sometime 01/23/2017 per Dr. Baldwin) after it flipped over. The site is healed, again notable for moderate scar formation.  Cardiovascular:   The patient has a regular cardiac rate and rhythm without murmurs, rubs, or gallops. The peripheral pulses are equal and full.  Abdomen:   The belly is soft and obese. Her habitus precludes an adequate exam. There is no rebound or guarding. There is no obvious organomegaly, mass-effect, or tenderness. Bowel sounds are active and of normal character. The lower abdominal incision is healed.   Extremities:   There is (subtle) arm lymphedema, right greater than left with no evidence of cyanosis or clubbing with 1+ ankle edema.  Rheumatologic:   There is no overt evidence of rheumatoid deformities of the hands. There is no sausaging of the fingers. There is no sign of active synovitis. The gait is slow but  independent.  Cutaneous:   There are no overt rashes, disseminated lesions, purpura, or petechiae.   Lymphatics:   There is no evidence of adenopathy in the cervical, supraclavicular, or axillary areas.  Neurologic:   The patient is alert, oriented, cooperative, and pleasant. As usual, she acknowledges me but is not conversant. She ambulated into the exam room without assistance and transferred from chair to exam table unassisted today. There is no overt dysfunction of the motor, sensory, or cerebellar systems.  Psych:   Mood and affect are appropriate for circumstance. Eye contact is appropriate.        LABS    Lab Results - Last 18 Months   Lab Units 03/01/23  1439 09/01/22  1413 03/03/22  1301   HEMOGLOBIN g/dL 15.2 14.8 14.7   HEMATOCRIT % 47.9* 45.9 46.0   MCV fL 95.0 94.6 94.5   WBC 10*3/mm3 10.87* 10.50 10.25   RDW % 13.1 13.3 13.3   MPV fL 10.1 9.9 10.2   PLATELETS 10*3/mm3 353 337 336   IMM GRAN % % 0.5 0.6* 0.5   NEUTROS ABS 10*3/mm3 7.17* 6.84 6.95   LYMPHS ABS 10*3/mm3 2.66 2.63 2.36   MONOS ABS 10*3/mm3 0.82 0.81 0.71   EOS ABS 10*3/mm3 0.12 0.11 0.11   BASOS ABS 10*3/mm3 0.05 0.05 0.07   IMMATURE GRANS (ABS) 10*3/mm3 0.05 0.06* 0.05   NRBC /100 WBC 0.0 0.0 0.0       Lab Results - Last 18 Months   Lab Units 03/01/23  1439 09/01/22  1413 03/03/22  1301   GLUCOSE mg/dL 146* 166* 169*   SODIUM mmol/L 140 137 140   POTASSIUM mmol/L 4.7 4.5 4.4   CO2 mmol/L 25.0 27.0 27.0   CHLORIDE mmol/L 103 98 103   ANION GAP mmol/L 12.0 12.0 10.0   CREATININE mg/dL 0.68 0.77 0.70   BUN mg/dL 13 19 15   BUN / CREAT RATIO  19.1 24.7 21.4   CALCIUM mg/dL 10.0 10.3 9.8   ALK PHOS U/L 106 104 94   TOTAL PROTEIN g/dL 7.9 7.9 7.7   ALT (SGPT) U/L 12 15 13   AST (SGOT) U/L 7 6 5   BILIRUBIN mg/dL 0.3 0.3 0.3   ALBUMIN g/dL 4.6 4.70 4.40   GLOBULIN gm/dL 3.3 3.2 3.3       Lab Results - Last 18 Months   Lab Units 03/01/23  1439 09/01/22  1413 03/03/22  1301   CEA ng/mL 4.90 3.74 3.24         ASSESSMENT:   1. Right breast invasive  "ductal carcinoma:   Stage: At least IIIA (cT3, cN2a, M0, G3), ER (-)/ND (-). HER-2/avelina (-). High grade (20 mitoses per high-powered field).   Tumor [place]: Multiple solid nodules in the right breast (multiple lesions at 11 o'clock measuring 2.9 x 2.4 x 2.2 cm, 1.7 x 1.4 x 1.8 cm, 1.9 x 1.6 x 1.9 cm, 1.2 x 0.8 x 1.1 cm); a lesion at the 10 o'clock in the right breast measuring 1.4 x 1.2 x 1.6 cm; 3 nodules in the right axillary region measuring 2.9 x 2.7 x 2.1 cm, another 1.8 x 1.3 x 1.7 cm, and a third measuring 1.2 x 0.7 x 1 cm.   Complications of Tumor: Pain and fullness of the right breast and right axilla.   BRCA status (1 and 2): No mutations, 12/2013.   Tumor Status: Underwent neoadjuvant chemotherapy followed by bilateral mastectomies, 07/30/2013. Pathology revealed: 1. Breast and lymph node, right modified radical mastectomy: Poorly differentiated infiltrating ductal cell carcinoma with extensive necrosis. (Margins of resection free of involvement). Fibrocystic changes. Fibroadenoma. Metastatic carcinoma in lymph nodes (2 out of 27 lymph nodes). 2. Breast, total left mastectomy: Nonproliferative fibrocystic changes (negative for malignancy). Adjuvant cisplatin and radiation completed. LENARD.  2. Mental retardation.   3. Mild neutrophilic leukocytosis, NOS.   --Flow cytometry, 11/16/2016 (above) unrevealing.  No localizing signs/symptoms.    --Normal counts since 03/03/2022  4. Borderline erythrocytosis.  Normal counts since 03/03/2022.  5. Lymphedema both arms. Asymptomatic.   6. Hypothyroidism.   7. Mildly abnormal CEA.   --Stable, 4.9, 03/01/2022 (prior range: 2.2 - 5.2).   8. Obesity.   9. Chronic kidney disease, GFR 57.35 mL/min, 01/27/2014, and 02/12/2014.   --Stable,  mL/min, 03/01/2023 (prior range: 51 - 104 mL/min).   10. Abnormal chest x-ray, 02/06/2015. Atelectasis versus small area of pneumonia.   a. Repeat chest x-ray 04/06/2015 was essentially unchanged, PCP says \"everything okay.\"   b. " Chest x-ray, 10/16/2015 and 03/11/2016 at [facility]: No active disease.   c. [facility], Chest x-ray, 06/09/2016. Impression: Chronic elevation of the right diaphragm. No acute cardiac or pulmonary abnormality is identified.   d. [place] [facility] [place]. Impression: Chronic elevation of the right diaphragm. No acute cardiac or pulmonary abnormality is identified.       11.  Hyperglycemia.  On metformin.    PLAN:  1.  Apprised of labs, 03/01/2023 with normal CBC, including normal MCV without anemia, normal platelets, hyperglycemia (146) with otherwise normal CMP, trivially elevated (stable) CEA, and normal CA27-29.    2. Remind them of the plans as outlined by Dr. Puente on 09/30/2013 (date she saw the patient). Dr. Puente suggested a PET/CT (above) and suggested considering BRCA testing (no mutations, 12/2013). Dr. Puente recommended: Cisplatin 25 mg/m2 every 3 weeks x4 cycles, otherwise observation with treatment at the time of relapse. Dr. Puente agrees that she is at high risk for relapse.   3.  Observe.   4.  Continue ongoing management per primary care physician and other specialists.   5.  Return to the [place] office in 12 months with pre-office CMP, CEA, CA27-29, and CBC with differential.      I spent ~30 minutes caring for Gopal on this date of service. This time includes time spent by me in the following activities: preparing for the visit, reviewing tests, performing a medically appropriate examination and/or evaluation, counseling and educating the patient/family/caregiver, ordering medications, tests, or procedures and documenting information in the medical record

## 2023-03-08 ENCOUNTER — OFFICE VISIT (OUTPATIENT)
Dept: ONCOLOGY | Facility: CLINIC | Age: 53
End: 2023-03-08
Payer: MEDICARE

## 2023-03-08 VITALS
SYSTOLIC BLOOD PRESSURE: 118 MMHG | RESPIRATION RATE: 18 BRPM | HEIGHT: 63 IN | DIASTOLIC BLOOD PRESSURE: 70 MMHG | BODY MASS INDEX: 46.25 KG/M2 | HEART RATE: 88 BPM | WEIGHT: 261 LBS | TEMPERATURE: 97.5 F | OXYGEN SATURATION: 99 %

## 2023-03-08 DIAGNOSIS — Z17.1 MALIGNANT NEOPLASM OF RIGHT BREAST IN FEMALE, ESTROGEN RECEPTOR NEGATIVE, UNSPECIFIED SITE OF BREAST: Primary | ICD-10-CM

## 2023-03-08 DIAGNOSIS — C50.911 MALIGNANT NEOPLASM OF RIGHT BREAST IN FEMALE, ESTROGEN RECEPTOR NEGATIVE, UNSPECIFIED SITE OF BREAST: Primary | ICD-10-CM

## 2023-03-08 PROCEDURE — 99214 OFFICE O/P EST MOD 30 MIN: CPT | Performed by: INTERNAL MEDICINE

## 2024-03-02 NOTE — PROGRESS NOTES
MGW ONC [facility] ONCOLOGY  [address] [place] [address] [address]  [facility] [address]  [phone]    Patient Name: Gopal Powers  Encounter Date: 03/07/2024  YOB: 1970  Patient Number: [id number]     REASON FOR VISIT:  Ms. Gopal Powers is a 53-year-old female who returns in follow-up of locally advanced breast cancer. She is seen 129 months since completing cycle 6 of neoadjuvant ACT chemotherapy. She underwent bilateral mastectomies on 07/30/2013 and is now ~ 124 months since cycle 4 of adjuvant cisplatin. She completed adjuvant radiation 116 months ago on 07/14/2014. She is here with her sister, Shayla (previously with her brother-in-law, Padilla).     I have reviewed the HPI and verified with the patient the accuracy of it. No changes to interval history since the information was documented. Ludwin Whitaker MD 03/07/24      DIAGNOSTIC ABNORMALITIES:   Bilateral mammogram and right breast ultrasound, 12/18/2012, [facility]: Nodule just superior and lateral to the right nipple measuring 15 x 16 mm. Another density in the upper outer quadrant measures 12 x 17 mm. Deep to this is a large nodular group of nodules that measure 3.4 x 3.6 cm. Another 3 cm nodule, probable enlarged lymph node, in the right axillary region. Ultrasound showed multiple lesions at the 11 o'clock position. One measures 2.9 x 2.4 x 2 cm. Another 1.7 x 1.4 x 1.8 cm. Another 1.9 x 1.6 x 1.9 cm (the latter predominantly solid). The 4th lesion at the 11 o'clock position measures 1.2 x 0.8 x 1.1 cm. Three nodules in the right axillary region; one measures 2.9 x 2.7 x 2.1 cm, another 1.8 x 1.3 x 1.7 cm, and a third measures 1.2 x 0.7 x 1 cm. The lesion at the 10 o'clock position in the right breast measures 1.4 x 1.2 x 1.6 cm. This appears lobulated, predominantly solid.  Chest x-ray, 01/04/2013: Negative chest radiograph.  Ultrasound-guided biopsy of the right breast masses, 01/07/2013:  Large lobulated mass in the upper outer quadrant of the right breast and the retroareolar area of the right breast. Noted was an enlarged pathologic appearing right axillary node which was not biopsied. Pathology from the specimens taken during the procedure included the biopsy from the 10 o'clock position (5.7 cm lesion) revealing infiltrating mammary carcinoma. Tumor present in virtually every biopsy. Tumor high grade (20 mitoses per high-powered field). Tumor measured 1 cm in greatest linear dimension. The specimen from the 11 o'clock position in the retroareolar area measured 1.7 cm. Infiltrating mammary carcinoma. Tumor present in virtually every biopsy. Tumor high grade. Tumor measured 1.1 cm in greatest linear dimension.  CT scan chest/abdomen/pelvis 01/21/2013, [facility]: Right axillary lymphadenopathy (the more anterior of 2 nodules measures 3.5 x 2.7 cm; the more posterior measures 2.6 x 2.1 cm). Skin thickening in the partially visualized right breast. Left ovary enlarged with a dermoid tumor within it. Previous cholecystectomy.  Labs, 02/01/2013: Hemoglobin 15.8, hematocrit 49, MCV 93.1, platelets 350,000, WBC 11.7, ANC 8.6. CMP revealed glucose 164, potassium 3.1, SGOT 6, GFR 97.1. CEA 3.2, CA27-29 of 22.9.  2D echo, 02/05/2013, [facility]: Technically limited and suboptimal study. Left ventricular chamber size, wall thickness, and wall motion normal. No segmental wall motion abnormalities noted. Left ventricular (LV) systolic function normal. Ejection fraction measured at 63%. Left ventricular (LV) diastolic parameters normal. Both atria are normal size. No atrial clots or masses seen. Mitral valve leaflets normal thickness and excursion. No mitral regurgitation. Aortic valve leaflets normal thickness and excursion. Minimal aortic regurgitation. No aortic stenosis. Tricuspid valve anatomically normal. No tricuspid regurgitation. No pericardial effusion.  PET scan,  10/15/2013, [facility]: Abnormal uptake in the chest wall bilaterally that appears to correspond to areas of scarring in both breasts (likely related to the recent surgery), greater on the right side with SUV max 6.9. Multiple areas of paravertebral uptake in the thorax, likely muscular or related to brown fat uptake. Mild uptake in 3 small normal sized lymph nodes in the left axilla. SUV max of the most intense uptake 2. 4.4 cm dermoid tumor in the pelvis in the midline anterior to the uterine fundus, likely arising from the left ovary. Right ovary seen separately. No other areas of pathologic uptake are seen within the neck, chest, abdomen, or pelvis.  BRCA1 and 2, 12/02/2012, [facility]: No mutations detected.  Surgical pathology, left tube and ovary, 05/29/2014, [facility]: Fallopian tube showed no significant pathologic changes. Ovary showed benign mature teratoma.  Peripheral blood comprehensive report, 11/16/2016: Mild absolute leukocytosis. COMMENT. Peripheral blood evaluation: Leukocytosis comprised predominantly of granulocytes with a normal complement of lymphocytes, monocytes, and platelets. Flow cytometric studies: No evidence of significant immunophenotypic aberrancies. No specific findings in the peripheral blood to suggest a cause for the patient's relative neutrophilia. Diagnosis of a myeloid stem cell disorder, if clinically suspected, is best made using bone marrow biopsy. Peripheral blood may not always be fully representative of an underlying bone marrow disorder. Clinical correlation recommended.    PREVIOUS INTERVENTIONS:   Neoadjuvant ACT chemotherapy, 02/18/2013 through 06/18/2013. 6 cycles. Prior cycles of chemo delayed due to shingles outbreak.  Right modified radical mastectomy (MRM), left prophylactic mastectomy, 07/30/2013. Dr. Mejia. Pathology revealed: 1) Breast and lymph node, right modified radical mastectomy: Poorly differentiated infiltrating ductal cell carcinoma  with extensive necrosis. (Margins of resection free of involvement). Fibrocystic changes. Fibroadenoma. Metastatic carcinoma in lymph nodes (2 out of 27 lymph nodes). 2) Breast, total left mastectomy: Nonproliferative fibrocystic changes (negative for malignancy). ER: Absent/Negative 0.81%. [place]: Absent/Negative 0.09 %. HER-2: Not over-expressed. 1+  Adjuvant cisplatin chemotherapy, 11/06/2013 through 01/16/2014. 4 cycles.  Adjuvant radiation beginning 05/13/2014 through 07/14/2014 (5040 cGy with 1000 cGy boost).        Problem List Items Addressed This Visit    None      Oncology/Hematology History   Malignant neoplasm of right female breast   9/2/2016 Initial Diagnosis    Malignant neoplasm of right female breast (CMS/HCC)     12/13/2019 Cancer Staged    Staging form: Breast, AJCC V7  - Clinical stage from 12/13/2019: Stage IIIA (T3(m), N2a, M0) - Signed by Ludwin Whitaker MD on 12/13/2019         PAST MEDICAL HISTORY:  ALLERGIES:  No Known Allergies  CURRENT MEDICATIONS:  Outpatient Encounter Medications as of 3/7/2024   Medication Sig Dispense Refill    amLODIPine (NORVASC) 5 MG tablet Take 1 tablet by mouth Daily.  3    atorvastatin (LIPITOR) 40 MG tablet Take 1 tablet by mouth Daily.  3    levothyroxine (SYNTHROID, LEVOTHROID) 50 MCG tablet Take 1 tablet by mouth Every Morning Before Breakfast.  3    metFORMIN ER (GLUCOPHAGE-XR) 500 MG 24 hr tablet        No facility-administered encounter medications on file as of 3/7/2024.     ADULT ILLNESSES:   Breast cancer ( ER Status: Negative; [place] Status: Negative; ICD-10:C50.411 ;Malignant neoplasm of upper-outer quadrant of right female breast )   Abnormal chest x ray ( ICD-10:R93.8 ;Abnormal findings on diagnostic imaging of other specified body structures   Chronic kidney disease ( ICD-10:N18.3 ;Chronic kidney disease, stage 3 (moderate)   Disorder of breast (disorder) ( lesion; ICD-10:D24.1 ;Benign neoplasm of right breast )   History of mastectomy ( ICD-10:Z90.11  ;Acquired absence of right breast and nipple   Hyperlipidemia ( ICD-10:E78.5 ;Hyperlipidemia, unspecified   Hypertension ( ICD-10:I10 ;Essential (primary) hypertension   Hypothyroidism ( ICD-10:E03.9 ;Hypothyroidism, unspecified   Leukocytosis ( ICD-10:D72.829 ;Elevated white blood cell count, unspecified   Mental retardation ( ICD-10:F79 ;Unspecified intellectual disabilities   Obesity ( ICD-10:E66.9 ;Obesity, unspecified   Wound dehiscence ( right mastectomy; ICD-10:T81.31xS ;Disruption of external operation (surgical) wound, not elsewhere classified, sequela )    SURGERIES:   Removal of left port and placement of right subclavian port, 03/05/2013. Dr. Mejia   Left clavicle repair of childhood fracture   Excision of breast lump: Right breast mass excision, 11/10/2011, Dr. Saini   Port removal (right subclavian) sometime 01/23/2017. Dr. Baldwin   Laparoscopic cholecystectomy, 2001, Dr. Flynn   Oophorectomy, left, 06/29/2014, Dr. Dodson   Oophorectomy, right, 05/29/2014. A 4.4 cm dermoid tumor. Pathology showed benign teratoma   Left subclavian Mediport, 02/08/2013, Dr. Mejia      ADULT ILLNESSES:  Patient Active Problem List   Diagnosis Code    Malignant neoplasm of right female breast C50.911    Port catheter in place Z95.828    Hypothyroidism E03.9    Mixed hyperlipidemia E78.2    Vitamin D deficiency E55.9     SURGERIES:  No past surgical history on file.  HEALTH MAINTENANCE ITEMS:  Health Maintenance Due   Topic Date Due    URINE MICROALBUMIN  Never done    BMI FOLLOWUP  Never done    COLORECTAL CANCER SCREENING  Never done    Pneumococcal Vaccine 0-64 (1 of 2 - PCV) Never done    ZOSTER VACCINE (1 of 2) Never done    HEPATITIS C SCREENING  Never done    DIABETIC FOOT EXAM  Never done    PAP SMEAR  Never done    DIABETIC EYE EXAM  Never done    INFLUENZA VACCINE  08/01/2023    COVID-19 Vaccine (1 - 2023-24 season) Never done    HEMOGLOBIN A1C  12/09/2023       <no information>  Last Completed Colonoscopy  "      This patient has no relevant Health Maintenance data.            There is no immunization history on file for this patient.  Last Completed Mammogram       This patient has no relevant Health Maintenance data.              FAMILY HISTORY:  History reviewed. No pertinent family history.  SOCIAL HISTORY:  Social History     Socioeconomic History    Marital status: Single   Tobacco Use    Smoking status: Never    Smokeless tobacco: Never       REVIEW OF SYSTEMS: Obtained with assistance from her sister, Shayla  Constitutional:   The patient's appetite is good, sister says, \"she eats in the middle of the night when no one is watching.\" Her energy is fairly good, sister again says, \"She's doing pretty good otherwise.\" Sister says she has been active at the adult . She has lost 25 lb (in addition to 2 lb aat her prior visit-had gained 28 lb at her 2 visits prior to that) since her last visit. Sister says she has been supervising her meals, \"she hasn't managed to sneak in snacks when I'm asleep.\" She has no fevers, chills, or drenching night sweats. Her sleep habits seem appropriate.  Ear/Nose/Mouth/Throat:   She reports no ear pains, but has allergy sinus symptoms, without sore throat, nosebleeds, or sore tongue. She has no headaches. She denies any hoarseness, change in voice quality.  Ocular:   She reports no eye pain, significant change in visual acuity, double vision, or blurry vision.  Respiratory:   She has baseline exertional dyspnea but has no shortness of breath with her routine activities. She reports no cough, no significant shortness of breathing at rest, phlegm production, or unexplained chest wall pain.  Cardiovascular:   She reports no exertional chest pain, chest pressure, or chest heaviness. She has no claudication. She reports no palpitations or symptomatic orthostasis.  Gastrointestinal:   She reports no dysphagia, nausea, vomiting, postprandial abdominal pain, bloating, cramping, change " "in bowel habits, or discoloration of the stool. She reports no rectal bleeding. She has not had any constipation but still has bouts of soft stools (chronic) but no overt diarrhea.  Genitourinary:   She reports no urinary burning, frequency, dribbling, or discoloration. She reports no need to urinate frequently through the night. She reports no difficulty controlling her bladder.  GYN:   She has no unexplained vaginal bleeding and no significant vaginal discharge.  Breasts:   She denies any more discomfort nor \"soreness\" from the surgical sites.  Musculoskeletal:   She reports no unexplained arthralgias, myalgias, or nighttime leg cramping.  Extremities:   She reports trouble with fluid retention in the arms but no significant leg swelling.  Endocrine:   She reports no problems with excess thirst, excessive urination, vasomotor instability, or unexplained fatigue.  Heme/Lymphatic:   She reports no unexplained bleeding, bruising, petechial rash, or swollen glands.  Skin:   She has intermittent itching but no rash around the lower abdominal incision site. No recurrent shingles on her back.  Neuro:   She reports no loss of consciousness, seizures, fainting spells, or dizziness. She has no weakness of her face, arms, or legs. She has no difficulty with speech. She has no tremors.  Psych:   She seems generally satisfied with life. She denies depression nor anxiety. She reports no mood swings.      VITAL SIGNS: /70   Pulse 80   Temp 97.4 °F (36.3 °C) (Temporal)   Resp 18   Ht 160 cm (63\")   Wt 107 kg (236 lb)   BMI 41.81 kg/m² Body surface area is 2.07 meters squared.  Pain Score    03/07/24 1348   PainSc: 0-No pain           PHYSICAL EXAMINATION:   General:   She is a pleasant, morbidly obese and modestly-kept female who is comfortable at rest. She arrived in the exam room ambulatory. She appears to be her stated age. Her skin color is normal. ECOG PS = 0  Head/Neck:   The patient is anicteric and atraumatic. " The trachea is midline. The neck is supple without evidence of jugular venous distention or cervical adenopathy.  Eyes:   The pupils are equal, round, and reactive to light. The extraocular movements are full. There is no scleral jaundice or erythema.  Chest:   The respiratory efforts are normal and unhindered. The chest is clear to auscultation and percussion. There are no wheezes, rhonchi, rales, or asymmetry of breath sounds.  Breasts:         Chaperoned exam:  Shayla (patient sister)  Inspection of the mastectomy sites is again notable for healed wounds with underlying scar thickness and overlying skin folds (left over post-op) bilaterally. There is no axillary adenopathy. The previous Mediport site in the left upper chest is healed. The right-sided Mediport has been removed (sometime 01/23/2017 per Dr. Baldwin) after it flipped over. The site is healed, again notable for moderate indented scar formation.  Cardiovascular:   The patient has a regular cardiac rate and rhythm without murmurs, rubs, or gallops. The peripheral pulses are equal and full.  Abdomen:   The belly is soft and obese. Her habitus precludes an adequate exam. There is no rebound or guarding. There is no obvious organomegaly, mass-effect, or tenderness. Bowel sounds are active and of normal character. The lower abdominal incision is healed.   Extremities:   There is (subtle) arm lymphedema, right greater than left with no evidence of cyanosis or clubbing with 1+ ankle edema.  Rheumatologic:   There is no overt evidence of rheumatoid deformities of the hands. There is no sausaging of the fingers. There is no sign of active synovitis. The gait is slow but independent.  Cutaneous:   There are no overt rashes, disseminated lesions, purpura, or petechiae.   Lymphatics:   There is no evidence of adenopathy in the cervical, supraclavicular, or axillary areas.  Neurologic:   The patient is alert, oriented, cooperative, and pleasant. As usual, she  acknowledges me but is not conversant. She ambulated into the exam room without assistance and transferred from chair to exam table unassisted today. There is no overt dysfunction of the motor, sensory, or cerebellar systems.  Psych:   Mood and affect are appropriate for circumstance. Eye contact is appropriate.        LABS    Lab Results - Last 18 Months   Lab Units 03/04/24  1329 03/01/23  1439   HEMOGLOBIN g/dL 14.7 15.2   HEMATOCRIT % 45.6 47.9*   MCV fL 94.6 95.0   WBC 10*3/mm3 13.15* 10.87*   RDW % 13.2 13.1   MPV fL 10.3 10.1   PLATELETS 10*3/mm3 354 353   IMM GRAN % % 0.3 0.5   NEUTROS ABS 10*3/mm3 8.81* 7.17*   LYMPHS ABS 10*3/mm3 3.24* 2.66   MONOS ABS 10*3/mm3 0.93* 0.82   EOS ABS 10*3/mm3 0.07 0.12   BASOS ABS 10*3/mm3 0.06 0.05   IMMATURE GRANS (ABS) 10*3/mm3 0.04 0.05   NRBC /100 WBC 0.0 0.0       Lab Results - Last 18 Months   Lab Units 03/04/24  1329 03/01/23  1439   GLUCOSE mg/dL 141* 146*   SODIUM mmol/L 140 140   POTASSIUM mmol/L 4.0 4.7   CO2 mmol/L 28.0 25.0   CHLORIDE mmol/L 100 103   ANION GAP mmol/L 12.0 12.0   CREATININE mg/dL 0.83 0.68   BUN mg/dL 26* 13   BUN / CREAT RATIO  31.3* 19.1   CALCIUM mg/dL 9.8 10.0   ALK PHOS U/L 82 106   TOTAL PROTEIN g/dL 7.5 7.9   ALT (SGPT) U/L 9 12   AST (SGOT) U/L <5 7   BILIRUBIN mg/dL 0.4 0.3   ALBUMIN g/dL 4.4 4.6   GLOBULIN gm/dL 3.1 3.3       Lab Results - Last 18 Months   Lab Units 03/04/24  1329 03/01/23  1439   CEA ng/mL 4.55 4.90         ASSESSMENT:   1. Right breast invasive ductal carcinoma:   Stage: At least IIIA (cT3, cN2a, M0, G3), ER (-)/MS (-). HER-2/avelina (-). High grade (20 mitoses per high-powered field).   Tumor [place]: Multiple solid nodules in the right breast (multiple lesions at 11 o'clock measuring 2.9 x 2.4 x 2.2 cm, 1.7 x 1.4 x 1.8 cm, 1.9 x 1.6 x 1.9 cm, 1.2 x 0.8 x 1.1 cm); a lesion at the 10 o'clock in the right breast measuring 1.4 x 1.2 x 1.6 cm; 3 nodules in the right axillary region measuring 2.9 x 2.7 x 2.1 cm, another 1.8  "x 1.3 x 1.7 cm, and a third measuring 1.2 x 0.7 x 1 cm.   Complications of Tumor: Pain and fullness of the right breast and right axilla.   BRCA status (1 and 2): No mutations, 12/2013.   Tumor Status: Underwent neoadjuvant chemotherapy followed by bilateral mastectomies, 07/30/2013. Pathology revealed: 1. Breast and lymph node, right modified radical mastectomy: Poorly differentiated infiltrating ductal cell carcinoma with extensive necrosis. (Margins of resection free of involvement). Fibrocystic changes. Fibroadenoma. Metastatic carcinoma in lymph nodes (2 out of 27 lymph nodes). 2. Breast, total left mastectomy: Nonproliferative fibrocystic changes (negative for malignancy). Adjuvant cisplatin and radiation completed. LENARD.  2. Mental retardation.   3. Mild neutrophilic leukocytosis, NOS.   --Flow cytometry, 11/16/2016 (above) unrevealing.  No localizing signs/symptoms.    --3/4/24- WBC 13; ANC 8.8  4. Borderline erythrocytosis.  Normal counts since 03/03/2022.  5. Lymphedema both arms. Asymptomatic.   6. Hypothyroidism.   7. Mildly abnormal CEA.   --Stable, 4.5, 3/4/24 (prior range: 2.2 - 5.2).   8. Obesity.   9. Chronic kidney disease, GFR 57.35 mL/min, 01/27/2014, and 02/12/2014.   --Stable, GFR 83.9 mL/min, 3/4/24 (prior range: 51 - 104 mL/min).   10. Abnormal chest x-ray, 02/06/2015. Atelectasis versus small area of pneumonia.   a. Repeat chest x-ray 04/06/2015 was essentially unchanged, PCP says \"everything okay.\"   b. Chest x-ray, 10/16/2015 and 03/11/2016 at [facility]: No active disease.   c. [facility], Chest x-ray, 06/09/2016. Impression: Chronic elevation of the right diaphragm. No acute cardiac or pulmonary abnormality is identified.   d. Chest x-ray performed at [facility] on 4/28/2017. Impression: Chronic elevation of the right diaphragm. No acute cardiac or pulmonary abnormality is identified.       11.  Hyperglycemia.  On metformin.    PLAN:  1.  Apprised of " labs, 3/4/24 with WBC 13 with ANC 8.8 otherwise normal CBC, including normal MCV without anemia, normal platelets, hyperglycemia (141) with otherwise normal CMP, trivially elevated (stable) CEA, and normal CA27-29.    2. Remind them of the plans as outlined by Dr. Puente on 09/30/2013 (date she saw the patient). Dr. Puente suggested a PET/CT (above) and suggested considering BRCA testing (no mutations, 12/2013). Dr. Puente recommended: Cisplatin 25 mg/m2 every 3 weeks x4 cycles, otherwise observation with treatment at the time of relapse. Dr. Puente agrees that she is at high risk for relapse.   3.  Observe.   4.  Continue ongoing management per primary care physician and other specialists.   5.  Return to the [place] office in 12 months with pre-office CMP, CEA, CA27-29, and CBC with differential.      I spent ~30 minutes caring for Gopal on this date of service. This time includes time spent by me in the following activities: preparing for the visit, reviewing tests, performing a medically appropriate examination and/or evaluation, counseling and educating the patient/family/caregiver, ordering medications, tests, or procedures and documenting information in the medical record

## 2024-03-04 ENCOUNTER — LAB (OUTPATIENT)
Dept: LAB | Facility: HOSPITAL | Age: 54
End: 2024-03-04
Payer: MEDICARE

## 2024-03-04 DIAGNOSIS — Z17.1 MALIGNANT NEOPLASM OF RIGHT BREAST IN FEMALE, ESTROGEN RECEPTOR NEGATIVE, UNSPECIFIED SITE OF BREAST: ICD-10-CM

## 2024-03-04 DIAGNOSIS — C50.911 MALIGNANT NEOPLASM OF RIGHT BREAST IN FEMALE, ESTROGEN RECEPTOR NEGATIVE, UNSPECIFIED SITE OF BREAST: ICD-10-CM

## 2024-03-04 LAB
ALBUMIN SERPL-MCNC: 4.4 G/DL (ref 3.5–5.2)
ALBUMIN/GLOB SERPL: 1.4 G/DL
ALP SERPL-CCNC: 82 U/L (ref 39–117)
ALT SERPL W P-5'-P-CCNC: 9 U/L (ref 1–33)
ANION GAP SERPL CALCULATED.3IONS-SCNC: 12 MMOL/L (ref 5–15)
AST SERPL-CCNC: <5 U/L (ref 1–32)
BASOPHILS # BLD AUTO: 0.06 10*3/MM3 (ref 0–0.2)
BASOPHILS NFR BLD AUTO: 0.5 % (ref 0–1.5)
BILIRUB SERPL-MCNC: 0.4 MG/DL (ref 0–1.2)
BUN SERPL-MCNC: 26 MG/DL (ref 6–20)
BUN/CREAT SERPL: 31.3 (ref 7–25)
CALCIUM SPEC-SCNC: 9.8 MG/DL (ref 8.6–10.5)
CEA SERPL-MCNC: 4.55 NG/ML
CHLORIDE SERPL-SCNC: 100 MMOL/L (ref 98–107)
CO2 SERPL-SCNC: 28 MMOL/L (ref 22–29)
CREAT SERPL-MCNC: 0.83 MG/DL (ref 0.57–1)
DEPRECATED RDW RBC AUTO: 45.9 FL (ref 37–54)
EGFRCR SERPLBLD CKD-EPI 2021: 83.9 ML/MIN/1.73
EOSINOPHIL # BLD AUTO: 0.07 10*3/MM3 (ref 0–0.4)
EOSINOPHIL NFR BLD AUTO: 0.5 % (ref 0.3–6.2)
ERYTHROCYTE [DISTWIDTH] IN BLOOD BY AUTOMATED COUNT: 13.2 % (ref 12.3–15.4)
GLOBULIN UR ELPH-MCNC: 3.1 GM/DL
GLUCOSE SERPL-MCNC: 141 MG/DL (ref 65–99)
HCT VFR BLD AUTO: 45.6 % (ref 34–46.6)
HGB BLD-MCNC: 14.7 G/DL (ref 12–15.9)
IMM GRANULOCYTES # BLD AUTO: 0.04 10*3/MM3 (ref 0–0.05)
IMM GRANULOCYTES NFR BLD AUTO: 0.3 % (ref 0–0.5)
LYMPHOCYTES # BLD AUTO: 3.24 10*3/MM3 (ref 0.7–3.1)
LYMPHOCYTES NFR BLD AUTO: 24.6 % (ref 19.6–45.3)
MCH RBC QN AUTO: 30.5 PG (ref 26.6–33)
MCHC RBC AUTO-ENTMCNC: 32.2 G/DL (ref 31.5–35.7)
MCV RBC AUTO: 94.6 FL (ref 79–97)
MONOCYTES # BLD AUTO: 0.93 10*3/MM3 (ref 0.1–0.9)
MONOCYTES NFR BLD AUTO: 7.1 % (ref 5–12)
NEUTROPHILS NFR BLD AUTO: 67 % (ref 42.7–76)
NEUTROPHILS NFR BLD AUTO: 8.81 10*3/MM3 (ref 1.7–7)
NRBC BLD AUTO-RTO: 0 /100 WBC (ref 0–0.2)
PLATELET # BLD AUTO: 354 10*3/MM3 (ref 140–450)
PMV BLD AUTO: 10.3 FL (ref 6–12)
POTASSIUM SERPL-SCNC: 4 MMOL/L (ref 3.5–5.2)
PROT SERPL-MCNC: 7.5 G/DL (ref 6–8.5)
RBC # BLD AUTO: 4.82 10*6/MM3 (ref 3.77–5.28)
SODIUM SERPL-SCNC: 140 MMOL/L (ref 136–145)
WBC NRBC COR # BLD AUTO: 13.15 10*3/MM3 (ref 3.4–10.8)

## 2024-03-04 PROCEDURE — 86300 IMMUNOASSAY TUMOR CA 15-3: CPT

## 2024-03-04 PROCEDURE — 80053 COMPREHEN METABOLIC PANEL: CPT

## 2024-03-04 PROCEDURE — 85025 COMPLETE CBC W/AUTO DIFF WBC: CPT

## 2024-03-04 PROCEDURE — 82378 CARCINOEMBRYONIC ANTIGEN: CPT

## 2024-03-04 PROCEDURE — 36415 COLL VENOUS BLD VENIPUNCTURE: CPT

## 2024-03-05 LAB — CANCER AG27-29 SERPL-ACNC: 23.1 U/ML (ref 0–38.6)

## 2024-03-07 ENCOUNTER — OFFICE VISIT (OUTPATIENT)
Dept: ONCOLOGY | Facility: CLINIC | Age: 54
End: 2024-03-07
Payer: MEDICARE

## 2024-03-07 VITALS
TEMPERATURE: 97.4 F | DIASTOLIC BLOOD PRESSURE: 70 MMHG | SYSTOLIC BLOOD PRESSURE: 122 MMHG | HEIGHT: 63 IN | WEIGHT: 236 LBS | HEART RATE: 80 BPM | BODY MASS INDEX: 41.82 KG/M2 | RESPIRATION RATE: 18 BRPM

## 2024-03-07 DIAGNOSIS — C50.911 MALIGNANT NEOPLASM OF RIGHT BREAST IN FEMALE, ESTROGEN RECEPTOR NEGATIVE, UNSPECIFIED SITE OF BREAST: Primary | ICD-10-CM

## 2024-03-07 DIAGNOSIS — Z17.1 MALIGNANT NEOPLASM OF RIGHT BREAST IN FEMALE, ESTROGEN RECEPTOR NEGATIVE, UNSPECIFIED SITE OF BREAST: Primary | ICD-10-CM

## (undated) DEVICE — 3M™ TEGADERM™ TRANSPARENT FILM DRESSING FRAME STYLE, 1626, 4 IN X 4-3/4 IN (10 CM X 12 CM), 50/CT 4CT/CASE: Brand: 3M™ TEGADERM™

## (undated) DEVICE — STERILE LATEX POWDER FREE SURGICAL GLOVES WITH HYDROGEL COATING: Brand: PROTEXIS

## (undated) DEVICE — MAJOR BSIN SETUP PK

## (undated) DEVICE — CHLORAPREP 26ML ORANGE

## (undated) DEVICE — AIRLIFE™ NASAL OXYGEN CANNULA CURVED, NONFLARED TIP, WITH 7' FEET (2.1 M) CRUSH RESISTANT TUBING, OVER-THE-EAR STYLE: Brand: AIRLIFE™

## (undated) DEVICE — ENCORE® LATEX MICRO SIZE 6, STERILE LATEX POWDER-FREE SURGICAL GLOVE: Brand: ENCORE

## (undated) DEVICE — 9165 UNIVERSAL PATIENT PLATE: Brand: 3M™

## (undated) DEVICE — ADHESIVE LIQ MASTISOL ST

## (undated) DEVICE — SUTURE VCRL SZ 4-0 L18IN ABSRB UD L19MM PS-2 3/8 CIR PRIM J496H

## (undated) DEVICE — SUTURE VCRL SZ 3-0 L27IN ABSRB UD L26MM SH 1/2 CIR J416H

## (undated) DEVICE — STERILE POLYISOPRENE POWDER-FREE SURGICAL GLOVES: Brand: PROTEXIS

## (undated) DEVICE — 3M™ STERI-STRIP™ REINFORCED ADHESIVE SKIN CLOSURES, R1546, 1/4 IN X 4 IN (6 MM X 100 MM), 10 STRIPS/ENVELOPE: Brand: 3M™ STERI-STRIP™

## (undated) DEVICE — DRAPE,LAP,CHOLE,W/TROUGHS,STERILE: Brand: MEDLINE

## (undated) DEVICE — SPONGE GZ W4XL4IN RAYON POLY FILL CVR W/ NONWOVEN FAB

## (undated) DEVICE — STANDARD SURGICAL GOWN, L: Brand: CONVERTORS

## (undated) DEVICE — ENCORE® LATEX MICRO SIZE 6.5, STERILE LATEX POWDER-FREE SURGICAL GLOVE: Brand: ENCORE